# Patient Record
Sex: FEMALE | Race: BLACK OR AFRICAN AMERICAN | NOT HISPANIC OR LATINO | Employment: UNEMPLOYED | URBAN - METROPOLITAN AREA
[De-identification: names, ages, dates, MRNs, and addresses within clinical notes are randomized per-mention and may not be internally consistent; named-entity substitution may affect disease eponyms.]

---

## 2017-12-07 ENCOUNTER — HOSPITAL ENCOUNTER (EMERGENCY)
Facility: MEDICAL CENTER | Age: 57
End: 2017-12-07
Attending: EMERGENCY MEDICINE
Payer: MEDICARE

## 2017-12-07 VITALS
TEMPERATURE: 97.5 F | WEIGHT: 166.01 LBS | DIASTOLIC BLOOD PRESSURE: 70 MMHG | SYSTOLIC BLOOD PRESSURE: 142 MMHG | BODY MASS INDEX: 27.66 KG/M2 | RESPIRATION RATE: 18 BRPM | OXYGEN SATURATION: 95 % | HEART RATE: 75 BPM | HEIGHT: 65 IN

## 2017-12-07 DIAGNOSIS — R11.0 NAUSEA: ICD-10-CM

## 2017-12-07 DIAGNOSIS — J34.89 RHINORRHEA: ICD-10-CM

## 2017-12-07 PROCEDURE — 99283 EMERGENCY DEPT VISIT LOW MDM: CPT

## 2017-12-07 NOTE — DISCHARGE INSTRUCTIONS
Nausea, Adult  Nausea means you feel sick to your stomach or need to throw up (vomit). It may be a sign of a more serious problem. If nausea gets worse, you may throw up. If you throw up a lot, you may lose too much body fluid (dehydration).  HOME CARE   · Get plenty of rest.  · Ask your doctor how to replace body fluid losses (rehydrate).  · Eat small amounts of food. Sip liquids more often.  · Take all medicines as told by your doctor.  GET HELP RIGHT AWAY IF:  · You have a fever.  · You pass out (faint).  · You keep throwing up or have blood in your throw up.  · You are very weak, have dry lips or a dry mouth, or you are very thirsty (dehydrated).  · You have dark or bloody poop (stool).  · You have very bad chest or belly (abdominal) pain.  · You do not get better after 2 days, or you get worse.  · You have a headache.  MAKE SURE YOU:  · Understand these instructions.  · Will watch your condition.  · Will get help right away if you are not doing well or get worse.     This information is not intended to replace advice given to you by your health care provider. Make sure you discuss any questions you have with your health care provider.     Document Released: 12/06/2012 Document Revised: 03/11/2013 Document Reviewed: 12/06/2012  [employer] Interactive Patient Education ©2016 [employer]

## 2017-12-07 NOTE — ED NOTES
Wheeled to triage  Chief Complaint   Patient presents with   • Cold Symptoms     wants to make sure she's OK to fly home to [place] on Monday     Pt has a hx of an aneurysm and having a pace maker placed less than a year ago, pt denies any CP or SOB, family is concerned about her flying home because she has a cold.

## 2017-12-08 ENCOUNTER — PATIENT OUTREACH (OUTPATIENT)
Dept: HEALTH INFORMATION MANAGEMENT | Facility: OTHER | Age: 57
End: 2017-12-08

## 2019-12-09 ENCOUNTER — HOSPITAL ENCOUNTER (EMERGENCY)
Dept: HOSPITAL 87 - ER | Age: 59
LOS: 1 days | Discharge: HOME | End: 2019-12-10
Payer: MEDICARE

## 2019-12-09 VITALS — BODY MASS INDEX: 32.37 KG/M2 | WEIGHT: 189.6 LBS | HEIGHT: 64 IN

## 2019-12-09 DIAGNOSIS — Z79.82: ICD-10-CM

## 2019-12-09 DIAGNOSIS — Z86.73: ICD-10-CM

## 2019-12-09 DIAGNOSIS — R51: ICD-10-CM

## 2019-12-09 DIAGNOSIS — I11.0: ICD-10-CM

## 2019-12-09 DIAGNOSIS — R06.02: Primary | ICD-10-CM

## 2019-12-09 DIAGNOSIS — Z95.0: ICD-10-CM

## 2019-12-09 DIAGNOSIS — Z98.890: ICD-10-CM

## 2019-12-09 DIAGNOSIS — E78.00: ICD-10-CM

## 2019-12-09 DIAGNOSIS — I50.9: ICD-10-CM

## 2019-12-09 DIAGNOSIS — Z79.899: ICD-10-CM

## 2019-12-09 LAB
BASOPHILS NFR BLD AUTO: 0.5 % (ref 0–2)
CHLORIDE SERPL-SCNC: 108 MEQ/L (ref 98–107)
EOSINOPHIL NFR BLD AUTO: 0.7 % (ref 0–5)
ERYTHROCYTE [DISTWIDTH] IN BLOOD BY AUTOMATED COUNT: 16.9 % (ref 11.6–14.6)
HCT VFR BLD AUTO: 35.2 % (ref 36–48)
HGB BLD-MCNC: 11.2 G/DL (ref 12–16)
LYMPHOCYTES NFR BLD AUTO: 24.7 % (ref 20–50)
MCH RBC QN AUTO: 26.6 PG (ref 28–32)
MCV RBC AUTO: 83.3 FL (ref 81–99)
MONOCYTES NFR BLD AUTO: 8.3 % (ref 2–8)
NEUTROPHILS NFR BLD AUTO: 65.8 % (ref 40–76)
PLATELET # BLD AUTO: 312 X1000/UL (ref 130–400)
PMV BLD AUTO: 8 FL (ref 7.4–10.4)
RBC # BLD AUTO: 4.22 MILL/UL (ref 4.2–5.4)

## 2019-12-09 PROCEDURE — 93005 ELECTROCARDIOGRAM TRACING: CPT

## 2019-12-09 PROCEDURE — 99284 EMERGENCY DEPT VISIT MOD MDM: CPT

## 2019-12-09 PROCEDURE — 36415 COLL VENOUS BLD VENIPUNCTURE: CPT

## 2019-12-09 PROCEDURE — 83880 ASSAY OF NATRIURETIC PEPTIDE: CPT

## 2019-12-09 PROCEDURE — 71045 X-RAY EXAM CHEST 1 VIEW: CPT

## 2019-12-09 PROCEDURE — 84484 ASSAY OF TROPONIN QUANT: CPT

## 2019-12-10 VITALS — SYSTOLIC BLOOD PRESSURE: 132 MMHG | DIASTOLIC BLOOD PRESSURE: 79 MMHG

## 2021-03-10 ENCOUNTER — HOSPITAL ENCOUNTER (OUTPATIENT)
Dept: HOSPITAL 87 - LAB | Age: 61
Discharge: HOME | End: 2021-03-10
Attending: INTERNAL MEDICINE
Payer: MEDICARE

## 2021-03-10 DIAGNOSIS — Z20.822: Primary | ICD-10-CM

## 2021-03-10 PROCEDURE — 87426 SARSCOV CORONAVIRUS AG IA: CPT

## 2021-03-11 ENCOUNTER — HOSPITAL ENCOUNTER (OUTPATIENT)
Dept: HOSPITAL 87 - US | Age: 61
Discharge: HOME | End: 2021-03-11
Attending: INTERNAL MEDICINE
Payer: MEDICARE

## 2021-03-11 DIAGNOSIS — R10.9: ICD-10-CM

## 2021-03-11 DIAGNOSIS — R10.819: Primary | ICD-10-CM

## 2021-03-11 PROCEDURE — 76770 US EXAM ABDO BACK WALL COMP: CPT

## 2024-06-11 ENCOUNTER — HOSPITAL ENCOUNTER (INPATIENT)
Dept: HOSPITAL 87 - ER | Age: 64
LOS: 5 days | Discharge: HOME HEALTH | DRG: 291 | End: 2024-06-16
Attending: STUDENT IN AN ORGANIZED HEALTH CARE EDUCATION/TRAINING PROGRAM | Admitting: STUDENT IN AN ORGANIZED HEALTH CARE EDUCATION/TRAINING PROGRAM
Payer: MEDICARE

## 2024-06-11 VITALS — BODY MASS INDEX: 28.93 KG/M2 | HEIGHT: 66 IN | WEIGHT: 180 LBS

## 2024-06-11 DIAGNOSIS — Z79.01: ICD-10-CM

## 2024-06-11 DIAGNOSIS — Z20.822: ICD-10-CM

## 2024-06-11 DIAGNOSIS — I50.33: ICD-10-CM

## 2024-06-11 DIAGNOSIS — Z95.3: ICD-10-CM

## 2024-06-11 DIAGNOSIS — N17.9: ICD-10-CM

## 2024-06-11 DIAGNOSIS — I11.0: Primary | ICD-10-CM

## 2024-06-11 DIAGNOSIS — I07.1: ICD-10-CM

## 2024-06-11 DIAGNOSIS — D64.0: ICD-10-CM

## 2024-06-11 DIAGNOSIS — Z95.0: ICD-10-CM

## 2024-06-11 DIAGNOSIS — Z87.891: ICD-10-CM

## 2024-06-11 DIAGNOSIS — I47.20: ICD-10-CM

## 2024-06-11 DIAGNOSIS — I95.9: ICD-10-CM

## 2024-06-11 DIAGNOSIS — J18.9: ICD-10-CM

## 2024-06-11 DIAGNOSIS — F41.0: ICD-10-CM

## 2024-06-11 DIAGNOSIS — D50.9: ICD-10-CM

## 2024-06-11 DIAGNOSIS — J96.00: ICD-10-CM

## 2024-06-11 DIAGNOSIS — E78.5: ICD-10-CM

## 2024-06-11 DIAGNOSIS — I48.0: ICD-10-CM

## 2024-06-11 DIAGNOSIS — I69.354: ICD-10-CM

## 2024-06-11 DIAGNOSIS — F32.9: ICD-10-CM

## 2024-06-11 LAB
ALBUMIN SERPL BCP-MCNC: 3.7 G/DL (ref 3.2–4.8)
ALT SERPL W P-5'-P-CCNC: 23 IU/L (ref 10–49)
APTT PPP: 34.7 SEC (ref 23.4–31)
AST SERPL W P-5'-P-CCNC: 34 IU/L (ref ?–34)
BASOPHILS NFR BLD AUTO: 1 % (ref 0–2)
BILIRUB DIRECT SERPL-MCNC: 0.2 MG/DL (ref ?–3)
BILIRUB SERPL-MCNC: 0.5 MG/DL (ref 0.1–1)
BUN SERPL-MCNC: 24 MG/DL (ref 9–23)
CALCIUM SERPL-MCNC: 9.1 MG/DL (ref 8.7–10.4)
CARDIAC TROPONIN I PNL SERPL HS: 18 NG/L (ref 3–34)
CHLORIDE SERPL-SCNC: 109 MEQ/L (ref 98–107)
CO2 SERPL-SCNC: 27 MEQ/L (ref 21–32)
CREAT SERPL-MCNC: 1.6 MG/DL (ref 0.6–1)
EOSINOPHIL NFR BLD AUTO: 1.4 % (ref 0–5)
ERYTHROCYTE [DISTWIDTH] IN BLOOD BY AUTOMATED COUNT: 18.1 % (ref 11.6–14.6)
GLUCOSE SERPL-MCNC: 107 MG/DL (ref 70–105)
HCT VFR BLD AUTO: 27.4 % (ref 36–48)
HGB BLD-MCNC: 8.4 G/DL (ref 12–16)
INR PPP: 3.1
LYMPHOCYTES NFR BLD AUTO: 21.7 % (ref 20–50)
MANUAL DIF COMMENT BLD-IMP: 0
MCH RBC QN AUTO: 24.1 PG (ref 28–32)
MCHC RBC AUTO-ENTMCNC: 30.5 G/DL (ref 31–37)
MCV RBC AUTO: 79 FL (ref 81–99)
MONOCYTES NFR BLD AUTO: 9.2 % (ref 2–8)
NEUTROPHILS NFR BLD AUTO: 66.7 % (ref 40–76)
PLATELET # BLD AUTO: 260 X1000/UL (ref 130–400)
PMV BLD AUTO: 7 FL (ref 7.4–10.4)
POTASSIUM SERPL-SCNC: 4 MEQ/L (ref 3.5–5.1)
PROT SERPL-MCNC: 6.6 G/DL (ref 6–8.3)
PROTHROMBIN TIME: 31.8 SEC (ref 9.6–11)
RBC # BLD AUTO: 3.46 MILL/UL (ref 4.2–5.4)
SODIUM SERPL-SCNC: 143 MEQ/L (ref 136–145)
WBC # BLD: 5.2 X1000/UL (ref 4.5–11)

## 2024-06-11 PROCEDURE — 82728 ASSAY OF FERRITIN: CPT

## 2024-06-11 PROCEDURE — 87426 SARSCOV CORONAVIRUS AG IA: CPT

## 2024-06-11 PROCEDURE — 93306 TTE W/DOPPLER COMPLETE: CPT

## 2024-06-11 PROCEDURE — 71045 X-RAY EXAM CHEST 1 VIEW: CPT

## 2024-06-11 PROCEDURE — 97162 PT EVAL MOD COMPLEX 30 MIN: CPT

## 2024-06-11 PROCEDURE — 84484 ASSAY OF TROPONIN QUANT: CPT

## 2024-06-11 PROCEDURE — 83540 ASSAY OF IRON: CPT

## 2024-06-11 PROCEDURE — 80061 LIPID PANEL: CPT

## 2024-06-11 PROCEDURE — 81003 URINALYSIS AUTO W/O SCOPE: CPT

## 2024-06-11 PROCEDURE — 87491 CHLMYD TRACH DNA AMP PROBE: CPT

## 2024-06-11 PROCEDURE — 84100 ASSAY OF PHOSPHORUS: CPT

## 2024-06-11 PROCEDURE — 84439 ASSAY OF FREE THYROXINE: CPT

## 2024-06-11 PROCEDURE — 94640 AIRWAY INHALATION TREATMENT: CPT

## 2024-06-11 PROCEDURE — 84156 ASSAY OF PROTEIN URINE: CPT

## 2024-06-11 PROCEDURE — 82270 OCCULT BLOOD FECES: CPT

## 2024-06-11 PROCEDURE — 80048 BASIC METABOLIC PNL TOTAL CA: CPT

## 2024-06-11 PROCEDURE — 36415 COLL VENOUS BLD VENIPUNCTURE: CPT

## 2024-06-11 PROCEDURE — 93005 ELECTROCARDIOGRAM TRACING: CPT

## 2024-06-11 PROCEDURE — 83880 ASSAY OF NATRIURETIC PEPTIDE: CPT

## 2024-06-11 PROCEDURE — 97165 OT EVAL LOW COMPLEX 30 MIN: CPT

## 2024-06-11 PROCEDURE — 83550 IRON BINDING TEST: CPT

## 2024-06-11 PROCEDURE — 87804 INFLUENZA ASSAY W/OPTIC: CPT

## 2024-06-11 PROCEDURE — 87591 N.GONORRHOEAE DNA AMP PROB: CPT

## 2024-06-11 PROCEDURE — 84443 ASSAY THYROID STIM HORMONE: CPT

## 2024-06-11 PROCEDURE — 80305 DRUG TEST PRSMV DIR OPT OBS: CPT

## 2024-06-11 PROCEDURE — 85027 COMPLETE CBC AUTOMATED: CPT

## 2024-06-11 PROCEDURE — 83735 ASSAY OF MAGNESIUM: CPT

## 2024-06-11 PROCEDURE — 80076 HEPATIC FUNCTION PANEL: CPT

## 2024-06-11 PROCEDURE — 99285 EMERGENCY DEPT VISIT HI MDM: CPT

## 2024-06-11 PROCEDURE — 85025 COMPLETE CBC W/AUTO DIFF WBC: CPT

## 2024-06-11 PROCEDURE — 82570 ASSAY OF URINE CREATININE: CPT

## 2024-06-11 RX ADMIN — Medication NR MG: at 23:32

## 2024-06-11 RX ADMIN — AZITHROMYCIN FOR INJECTION INJECTION, POWDER, LYOPHILIZED, FOR SOLUTION NR MLS/HR: 500 INJECTION INTRAVENOUS at 23:43

## 2024-06-11 RX ADMIN — CEFTRIAXONE ONE MLS/HR: 1 INJECTION, SOLUTION INTRAVENOUS at 22:54

## 2024-06-11 RX ADMIN — ACETAMINOPHEN STA MG: 325 TABLET, FILM COATED ORAL at 22:55

## 2024-06-11 RX ADMIN — FUROSEMIDE ONE MG: 10 INJECTION, SOLUTION INTRAMUSCULAR; INTRAVENOUS at 22:55

## 2024-06-12 VITALS
DIASTOLIC BLOOD PRESSURE: 65 MMHG | TEMPERATURE: 97 F | SYSTOLIC BLOOD PRESSURE: 114 MMHG | RESPIRATION RATE: 18 BRPM | HEART RATE: 82 BPM

## 2024-06-12 VITALS
HEART RATE: 71 BPM | SYSTOLIC BLOOD PRESSURE: 113 MMHG | RESPIRATION RATE: 21 BRPM | DIASTOLIC BLOOD PRESSURE: 65 MMHG | TEMPERATURE: 97.7 F

## 2024-06-12 VITALS
SYSTOLIC BLOOD PRESSURE: 125 MMHG | DIASTOLIC BLOOD PRESSURE: 63 MMHG | TEMPERATURE: 96.2 F | RESPIRATION RATE: 20 BRPM | HEART RATE: 68 BPM

## 2024-06-12 VITALS
HEART RATE: 71 BPM | DIASTOLIC BLOOD PRESSURE: 48 MMHG | RESPIRATION RATE: 17 BRPM | SYSTOLIC BLOOD PRESSURE: 86 MMHG | TEMPERATURE: 98.8 F

## 2024-06-12 VITALS
DIASTOLIC BLOOD PRESSURE: 56 MMHG | TEMPERATURE: 95.4 F | HEART RATE: 69 BPM | SYSTOLIC BLOOD PRESSURE: 98 MMHG | RESPIRATION RATE: 19 BRPM

## 2024-06-12 VITALS
DIASTOLIC BLOOD PRESSURE: 69 MMHG | SYSTOLIC BLOOD PRESSURE: 123 MMHG | TEMPERATURE: 97.1 F | RESPIRATION RATE: 18 BRPM | HEART RATE: 71 BPM

## 2024-06-12 LAB
AMPHETAMINES UR QL SCN: NEGATIVE
APPEARANCE UR: CLEAR
BACTERIA #/AREA URNS AUTO: (no result) /[HPF]
BARBITURATES UR QL SCN: NEGATIVE
BASOPHILS NFR BLD AUTO: 1 % (ref 0–2)
BENZODIAZ UR QL SCN: (no result)
BUN SERPL-MCNC: 28 MG/DL (ref 9–23)
BZE UR QL SCN: NEGATIVE
CALCIUM SERPL-MCNC: 8.6 MG/DL (ref 8.7–10.4)
CANNABINOIDS UR QL SCN: NEGATIVE
CARDIAC TROPONIN I PNL SERPL HS: 12 NG/L (ref 3–34)
CHLORIDE SERPL-SCNC: 109 MEQ/L (ref 98–107)
CHOLEST SERPL-MCNC: 133 MG/DL (ref ?–200)
CO2 SERPL-SCNC: 24 MEQ/L (ref 21–32)
COLOR UR: (no result)
CREAT SERPL-MCNC: 1.8 MG/DL (ref 0.6–1)
CREAT UR-MCNC: 186.5 MG/DL
EOSINOPHIL NFR BLD AUTO: 0.4 % (ref 0–5)
ERYTHROCYTE [DISTWIDTH] IN BLOOD BY AUTOMATED COUNT: 18 % (ref 11.6–14.6)
GLUCOSE SERPL-MCNC: 94 MG/DL (ref 70–105)
GLUCOSE UR STRIP.AUTO-MCNC: NEGATIVE MG/DL
HCT VFR BLD AUTO: 25.7 % (ref 36–48)
HDLC SERPL-MCNC: 66 MG/DL (ref 65–?)
HGB BLD-MCNC: 8 G/DL (ref 12–16)
HGB UR QL STRIP: (no result)
HYALINE CASTS #/AREA URNS AUTO: (no result) /LPF
INR PPP: 3
IRON SERPL-MCNC: 16 UG/DL (ref 50–170)
KETONES UR STRIP-MCNC: (no result) MG/DL
LDLC SERPL DIRECT ASSAY-MCNC: 51 MG/DL (ref 5–100)
LEUKOCYTE ESTERASE UR QL STRIP: NEGATIVE
LYMPHOCYTES NFR BLD AUTO: 17.9 % (ref 20–50)
MANUAL DIF COMMENT BLD-IMP: 0
MCH RBC QN AUTO: 24.4 PG (ref 28–32)
MCHC RBC AUTO-ENTMCNC: 31.2 G/DL (ref 31–37)
MCV RBC AUTO: 78.2 FL (ref 81–99)
MDMA UR QL SCN: (no result)
METHADONE UR QL SCN: NEGATIVE
MONOCYTES NFR BLD AUTO: 11 % (ref 2–8)
NEUTROPHILS NFR BLD AUTO: 69.7 % (ref 40–76)
NITRITE UR QL STRIP: NEGATIVE
OPIATES UR QL SCN: NEGATIVE
PCP UR QL SCN: NEGATIVE
PH UR STRIP: 5 [PH] (ref 4.5–8)
PHOSPHATE SERPL-MCNC: 4.1 MG/DL (ref 2.5–4.9)
PLATELET # BLD AUTO: 236 X1000/UL (ref 130–400)
PMV BLD AUTO: 7.5 FL (ref 7.4–10.4)
POTASSIUM SERPL-SCNC: 3.9 MEQ/L (ref 3.5–5.1)
PROT UR QL STRIP: (no result)
PROT UR-ACNC: 120 MG/DL
PROTHROMBIN TIME: 31 SEC (ref 9.6–11)
RBC # BLD AUTO: 3.29 MILL/UL (ref 4.2–5.4)
RBC #/AREA URNS AUTO: (no result) /HPF (ref 0–2)
SODIUM SERPL-SCNC: 142 MEQ/L (ref 136–145)
SP GR UR STRIP: 1.02 (ref 1–1.03)
SQUAMOUS #/AREA URNS AUTO: (no result) /LPF
T4 FREE SERPL-MCNC: 1.41 NG/DL (ref 0.89–1.76)
TIBC SERPL-MCNC: 318 UG/DL (ref 250–425)
TRIGL SERPL-MCNC: 56 MG/DL (ref 0–150)
TSH SERPL DL<=0.005 MIU/L-ACNC: 1.71 UIU/ML (ref 0.55–4.78)
UROBILINOGEN UR STRIP-MCNC: 1 E.U./DL (ref 0.2–1)
WBC # BLD: 5.2 X1000/UL (ref 4.5–11)
WBC #/AREA URNS AUTO: (no result) /HPF (ref 0–2)

## 2024-06-12 RX ADMIN — FOLIC ACID SCH MG: 1 TABLET ORAL at 08:19

## 2024-06-12 RX ADMIN — DOXYCYCLINE SCH MLS/HR: 100 INJECTION, POWDER, LYOPHILIZED, FOR SOLUTION INTRAVENOUS at 09:02

## 2024-06-12 RX ADMIN — METOPROLOL TARTRATE SCH MG: 25 TABLET ORAL at 20:52

## 2024-06-12 RX ADMIN — ATORVASTATIN CALCIUM SCH MG: 10 TABLET, FILM COATED ORAL at 20:51

## 2024-06-12 RX ADMIN — SODIUM CHLORIDE ONE MLS/HR: 450 INJECTION, SOLUTION INTRAVENOUS at 03:11

## 2024-06-12 RX ADMIN — ALPRAZOLAM NR MG: 0.25 TABLET ORAL at 00:56

## 2024-06-12 RX ADMIN — AMIODARONE HYDROCHLORIDE SCH MG: 200 TABLET ORAL at 08:19

## 2024-06-13 VITALS
RESPIRATION RATE: 18 BRPM | TEMPERATURE: 97.4 F | HEART RATE: 72 BPM | SYSTOLIC BLOOD PRESSURE: 136 MMHG | DIASTOLIC BLOOD PRESSURE: 68 MMHG

## 2024-06-13 VITALS
DIASTOLIC BLOOD PRESSURE: 72 MMHG | TEMPERATURE: 98.1 F | HEART RATE: 72 BPM | RESPIRATION RATE: 22 BRPM | SYSTOLIC BLOOD PRESSURE: 125 MMHG

## 2024-06-13 VITALS
TEMPERATURE: 96.7 F | RESPIRATION RATE: 20 BRPM | SYSTOLIC BLOOD PRESSURE: 114 MMHG | DIASTOLIC BLOOD PRESSURE: 50 MMHG | HEART RATE: 68 BPM

## 2024-06-13 VITALS
RESPIRATION RATE: 20 BRPM | DIASTOLIC BLOOD PRESSURE: 58 MMHG | HEART RATE: 74 BPM | SYSTOLIC BLOOD PRESSURE: 112 MMHG | TEMPERATURE: 98.2 F

## 2024-06-13 VITALS
DIASTOLIC BLOOD PRESSURE: 62 MMHG | SYSTOLIC BLOOD PRESSURE: 110 MMHG | RESPIRATION RATE: 20 BRPM | HEART RATE: 68 BPM | TEMPERATURE: 97.8 F

## 2024-06-13 VITALS
TEMPERATURE: 97.6 F | DIASTOLIC BLOOD PRESSURE: 78 MMHG | RESPIRATION RATE: 20 BRPM | HEART RATE: 72 BPM | SYSTOLIC BLOOD PRESSURE: 110 MMHG

## 2024-06-13 LAB
BUN SERPL-MCNC: 33 MG/DL (ref 9–23)
CALCIUM SERPL-MCNC: 8.6 MG/DL (ref 8.7–10.4)
CHLORIDE SERPL-SCNC: 109 MEQ/L (ref 98–107)
CO2 SERPL-SCNC: 24 MEQ/L (ref 21–32)
CREAT SERPL-MCNC: 1.5 MG/DL (ref 0.6–1)
ERYTHROCYTE [DISTWIDTH] IN BLOOD BY AUTOMATED COUNT: 18.1 % (ref 11.6–14.6)
GLUCOSE SERPL-MCNC: 102 MG/DL (ref 70–105)
HCT VFR BLD AUTO: 24.6 % (ref 36–48)
HGB BLD-MCNC: 7.5 G/DL (ref 12–16)
INR PPP: 2.8
MCH RBC QN AUTO: 24 PG (ref 28–32)
MCHC RBC AUTO-ENTMCNC: 30.3 G/DL (ref 31–37)
MCV RBC AUTO: 79.2 FL (ref 81–99)
PLATELET # BLD AUTO: 226 X1000/UL (ref 130–400)
POTASSIUM SERPL-SCNC: 4 MEQ/L (ref 3.5–5.1)
PROTHROMBIN TIME: 29 SEC (ref 9.6–11)
RBC # BLD AUTO: 3.11 MILL/UL (ref 4.2–5.4)
SODIUM SERPL-SCNC: 141 MEQ/L (ref 136–145)
WBC # BLD: 4.6 X1000/UL (ref 4.5–11)

## 2024-06-13 RX ADMIN — MONTELUKAST SODIUM SCH MG: 10 TABLET, FILM COATED ORAL at 17:42

## 2024-06-13 RX ADMIN — Medication PRN MG: at 16:20

## 2024-06-13 RX ADMIN — WARFARIN SODIUM SCH MG: 2 TABLET ORAL at 17:42

## 2024-06-13 RX ADMIN — IRON SUPPLEMENT SCH MG: 325 TABLET ORAL at 14:48

## 2024-06-13 RX ADMIN — DOCUSATE SODIUM PRN MG: 100 CAPSULE, LIQUID FILLED ORAL at 09:31

## 2024-06-13 RX ADMIN — BUMETANIDE SCH MG: 0.25 INJECTION INTRAMUSCULAR; INTRAVENOUS at 09:10

## 2024-06-13 RX ADMIN — CITALOPRAM HYDROBROMIDE SCH MG: 10 TABLET ORAL at 14:48

## 2024-06-13 RX ADMIN — HYDROCODONE BITARTRATE AND ACETAMINOPHEN NR TAB: 5; 325 TABLET ORAL at 04:33

## 2024-06-14 VITALS
TEMPERATURE: 96.6 F | SYSTOLIC BLOOD PRESSURE: 133 MMHG | RESPIRATION RATE: 18 BRPM | DIASTOLIC BLOOD PRESSURE: 66 MMHG | HEART RATE: 70 BPM

## 2024-06-14 VITALS
TEMPERATURE: 96.5 F | RESPIRATION RATE: 22 BRPM | DIASTOLIC BLOOD PRESSURE: 59 MMHG | HEART RATE: 69 BPM | SYSTOLIC BLOOD PRESSURE: 116 MMHG

## 2024-06-14 VITALS — OXYGEN SATURATION: 98 % | HEART RATE: 72 BPM | RESPIRATION RATE: 18 BRPM

## 2024-06-14 VITALS
RESPIRATION RATE: 20 BRPM | HEART RATE: 70 BPM | TEMPERATURE: 96.1 F | DIASTOLIC BLOOD PRESSURE: 57 MMHG | SYSTOLIC BLOOD PRESSURE: 126 MMHG

## 2024-06-14 VITALS
DIASTOLIC BLOOD PRESSURE: 61 MMHG | SYSTOLIC BLOOD PRESSURE: 124 MMHG | HEART RATE: 78 BPM | RESPIRATION RATE: 17 BRPM | TEMPERATURE: 97.2 F

## 2024-06-14 VITALS
DIASTOLIC BLOOD PRESSURE: 69 MMHG | TEMPERATURE: 97.9 F | HEART RATE: 73 BPM | RESPIRATION RATE: 18 BRPM | SYSTOLIC BLOOD PRESSURE: 139 MMHG

## 2024-06-14 VITALS
TEMPERATURE: 97.3 F | HEART RATE: 75 BPM | SYSTOLIC BLOOD PRESSURE: 124 MMHG | RESPIRATION RATE: 22 BRPM | DIASTOLIC BLOOD PRESSURE: 67 MMHG

## 2024-06-14 VITALS — RESPIRATION RATE: 20 BRPM | HEART RATE: 70 BPM | OXYGEN SATURATION: 97 %

## 2024-06-14 LAB
INR PPP: 2.6
PROTHROMBIN TIME: 26.7 SEC (ref 9.6–11)

## 2024-06-14 RX ADMIN — ALPRAZOLAM PRN MG: 0.25 TABLET ORAL at 00:31

## 2024-06-14 RX ADMIN — ACETAMINOPHEN PRN MG: 325 TABLET, FILM COATED ORAL at 13:33

## 2024-06-14 RX ADMIN — IPRATROPIUM BROMIDE AND ALBUTEROL SULFATE PRN ML: .5; 3 SOLUTION RESPIRATORY (INHALATION) at 13:09

## 2024-06-14 RX ADMIN — IPRATROPIUM BROMIDE AND ALBUTEROL SULFATE SCH ML: .5; 3 SOLUTION RESPIRATORY (INHALATION) at 08:20

## 2024-06-14 RX ADMIN — IRON SUPPLEMENT SCH MG: 325 TABLET ORAL at 08:53

## 2024-06-15 VITALS — OXYGEN SATURATION: 98 % | HEART RATE: 74 BPM | RESPIRATION RATE: 18 BRPM

## 2024-06-15 VITALS
RESPIRATION RATE: 15 BRPM | HEART RATE: 76 BPM | TEMPERATURE: 97.2 F | SYSTOLIC BLOOD PRESSURE: 131 MMHG | DIASTOLIC BLOOD PRESSURE: 64 MMHG

## 2024-06-15 VITALS
HEART RATE: 70 BPM | SYSTOLIC BLOOD PRESSURE: 124 MMHG | RESPIRATION RATE: 18 BRPM | DIASTOLIC BLOOD PRESSURE: 62 MMHG | TEMPERATURE: 97.1 F

## 2024-06-15 VITALS — RESPIRATION RATE: 18 BRPM | HEART RATE: 79 BPM | OXYGEN SATURATION: 97 %

## 2024-06-15 VITALS
TEMPERATURE: 97.8 F | HEART RATE: 70 BPM | RESPIRATION RATE: 22 BRPM | DIASTOLIC BLOOD PRESSURE: 52 MMHG | SYSTOLIC BLOOD PRESSURE: 147 MMHG

## 2024-06-15 VITALS — RESPIRATION RATE: 16 BRPM | OXYGEN SATURATION: 98 % | HEART RATE: 76 BPM

## 2024-06-15 VITALS
SYSTOLIC BLOOD PRESSURE: 142 MMHG | RESPIRATION RATE: 22 BRPM | TEMPERATURE: 96.9 F | DIASTOLIC BLOOD PRESSURE: 63 MMHG | HEART RATE: 73 BPM

## 2024-06-15 VITALS
RESPIRATION RATE: 17 BRPM | DIASTOLIC BLOOD PRESSURE: 63 MMHG | HEART RATE: 71 BPM | TEMPERATURE: 97.1 F | SYSTOLIC BLOOD PRESSURE: 116 MMHG

## 2024-06-15 VITALS — RESPIRATION RATE: 16 BRPM | HEART RATE: 70 BPM | OXYGEN SATURATION: 97 %

## 2024-06-15 VITALS
RESPIRATION RATE: 20 BRPM | HEART RATE: 76 BPM | SYSTOLIC BLOOD PRESSURE: 129 MMHG | DIASTOLIC BLOOD PRESSURE: 53 MMHG | TEMPERATURE: 97.1 F

## 2024-06-15 LAB
BASOPHILS NFR BLD AUTO: 0.9 % (ref 0–2)
BUN SERPL-MCNC: 25 MG/DL (ref 9–23)
CALCIUM SERPL-MCNC: 8.7 MG/DL (ref 8.7–10.4)
CHLORIDE SERPL-SCNC: 109 MEQ/L (ref 98–107)
CO2 SERPL-SCNC: 23 MEQ/L (ref 21–32)
CREAT SERPL-MCNC: 1.1 MG/DL (ref 0.6–1)
EOSINOPHIL NFR BLD AUTO: 1.2 % (ref 0–5)
ERYTHROCYTE [DISTWIDTH] IN BLOOD BY AUTOMATED COUNT: 18.1 % (ref 11.6–14.6)
GLUCOSE SERPL-MCNC: 85 MG/DL (ref 70–105)
HCT VFR BLD AUTO: 25.3 % (ref 36–48)
HGB BLD-MCNC: 7.5 G/DL (ref 12–16)
INR PPP: 2.5
LYMPHOCYTES NFR BLD AUTO: 23.7 % (ref 20–50)
MANUAL DIF COMMENT BLD-IMP: 1
MCH RBC QN AUTO: 23.7 PG (ref 28–32)
MCHC RBC AUTO-ENTMCNC: 29.6 G/DL (ref 31–37)
MCV RBC AUTO: 80 FL (ref 81–99)
MONOCYTES NFR BLD AUTO: 13.5 % (ref 2–8)
NEUTROPHILS NFR BLD AUTO: 60.7 % (ref 40–76)
PLATELET # BLD AUTO: 211 X1000/UL (ref 130–400)
PMV BLD AUTO: 7.7 FL (ref 7.4–10.4)
POTASSIUM SERPL-SCNC: 4 MEQ/L (ref 3.5–5.1)
PROTHROMBIN TIME: 26.5 SEC (ref 9.6–11)
RBC # BLD AUTO: 3.16 MILL/UL (ref 4.2–5.4)
SODIUM SERPL-SCNC: 141 MEQ/L (ref 136–145)
WBC # BLD: 3.8 X1000/UL (ref 4.5–11)

## 2024-06-16 VITALS
DIASTOLIC BLOOD PRESSURE: 56 MMHG | RESPIRATION RATE: 15 BRPM | SYSTOLIC BLOOD PRESSURE: 134 MMHG | HEART RATE: 84 BPM | TEMPERATURE: 97.9 F

## 2024-06-16 VITALS
TEMPERATURE: 97 F | HEART RATE: 85 BPM | RESPIRATION RATE: 18 BRPM | DIASTOLIC BLOOD PRESSURE: 62 MMHG | SYSTOLIC BLOOD PRESSURE: 126 MMHG

## 2024-06-16 VITALS
RESPIRATION RATE: 18 BRPM | TEMPERATURE: 97.8 F | SYSTOLIC BLOOD PRESSURE: 151 MMHG | HEART RATE: 70 BPM | DIASTOLIC BLOOD PRESSURE: 84 MMHG

## 2024-06-16 VITALS
OXYGEN SATURATION: 98 % | TEMPERATURE: 97.8 F | DIASTOLIC BLOOD PRESSURE: 54 MMHG | HEART RATE: 70 BPM | SYSTOLIC BLOOD PRESSURE: 120 MMHG

## 2024-06-16 VITALS — RESPIRATION RATE: 16 BRPM | HEART RATE: 70 BPM

## 2024-06-16 VITALS
RESPIRATION RATE: 18 BRPM | SYSTOLIC BLOOD PRESSURE: 157 MMHG | HEART RATE: 70 BPM | DIASTOLIC BLOOD PRESSURE: 67 MMHG | TEMPERATURE: 97.9 F

## 2024-06-16 LAB
INR PPP: 2.7
PROTHROMBIN TIME: 28.3 SEC (ref 9.6–11)

## 2024-07-12 ENCOUNTER — HOSPITAL ENCOUNTER (OUTPATIENT)
Dept: HOSPITAL 87 - CT | Age: 64
Discharge: HOME | End: 2024-07-12
Attending: INTERNAL MEDICINE
Payer: MEDICARE

## 2024-07-12 DIAGNOSIS — I51.7: ICD-10-CM

## 2024-07-12 DIAGNOSIS — J90: Primary | ICD-10-CM

## 2024-07-12 DIAGNOSIS — R06.09: ICD-10-CM

## 2024-07-12 PROCEDURE — 71250 CT THORAX DX C-: CPT

## 2024-07-16 ENCOUNTER — HOSPITAL ENCOUNTER (INPATIENT)
Dept: HOSPITAL 87 - ER | Age: 64
LOS: 3 days | Discharge: HOME | DRG: 286 | End: 2024-07-19
Attending: INTERNAL MEDICINE | Admitting: INTERNAL MEDICINE
Payer: MEDICARE

## 2024-07-16 VITALS — BODY MASS INDEX: 29.02 KG/M2 | HEIGHT: 64 IN | WEIGHT: 170 LBS

## 2024-07-16 DIAGNOSIS — I50.43: ICD-10-CM

## 2024-07-16 DIAGNOSIS — E83.42: ICD-10-CM

## 2024-07-16 DIAGNOSIS — E87.6: ICD-10-CM

## 2024-07-16 DIAGNOSIS — N17.9: ICD-10-CM

## 2024-07-16 DIAGNOSIS — I25.10: ICD-10-CM

## 2024-07-16 DIAGNOSIS — Z87.891: ICD-10-CM

## 2024-07-16 DIAGNOSIS — Z95.2: ICD-10-CM

## 2024-07-16 DIAGNOSIS — D50.0: ICD-10-CM

## 2024-07-16 DIAGNOSIS — I11.0: Primary | ICD-10-CM

## 2024-07-16 DIAGNOSIS — I48.20: ICD-10-CM

## 2024-07-16 DIAGNOSIS — F41.1: ICD-10-CM

## 2024-07-16 DIAGNOSIS — I07.1: ICD-10-CM

## 2024-07-16 DIAGNOSIS — I27.20: ICD-10-CM

## 2024-07-16 DIAGNOSIS — Z79.01: ICD-10-CM

## 2024-07-16 DIAGNOSIS — F32.A: ICD-10-CM

## 2024-07-16 DIAGNOSIS — Z86.73: ICD-10-CM

## 2024-07-16 DIAGNOSIS — Z95.0: ICD-10-CM

## 2024-07-16 DIAGNOSIS — Z91.199: ICD-10-CM

## 2024-07-16 LAB
ANISOCYTOSIS BLD QL: (no result)
BUN SERPL-MCNC: 17 MG/DL (ref 9–23)
CALCIUM SERPL-MCNC: 8.9 MG/DL (ref 8.7–10.4)
CARDIAC TROPONIN I PNL SERPL HS: 16 NG/L (ref 3–34)
CHLORIDE SERPL-SCNC: 109 MEQ/L (ref 98–107)
CO2 SERPL-SCNC: 26 MEQ/L (ref 21–32)
CREAT SERPL-MCNC: 1.2 MG/DL (ref 0.6–1)
EOSINOPHIL NFR BLD MANUAL: 3 % (ref 0–5)
ERYTHROCYTE [DISTWIDTH] IN BLOOD BY AUTOMATED COUNT: 20 % (ref 11.6–14.6)
GLUCOSE SERPL-MCNC: 95 MG/DL (ref 70–105)
HCT VFR BLD AUTO: 24.2 % (ref 36–48)
HGB BLD-MCNC: 6.8 G/DL (ref 12–16)
HYPOCHROMIA BLD QL: (no result)
LYMPHOCYTES NFR BLD MANUAL: 22 % (ref 20–60)
MANUAL DIF COMMENT BLD-IMP: 1
MCH RBC QN AUTO: 20.8 PG (ref 28–32)
MCHC RBC AUTO-ENTMCNC: 28.3 G/DL (ref 31–37)
MCV RBC AUTO: 73.7 FL (ref 81–99)
MICROCYTES BLD QL SMEAR: (no result)
MONOCYTES NFR BLD MANUAL: 11 % (ref 2–8)
NEUTS SEG NFR BLD MANUAL: 64 % (ref 45–75)
PLATELET # BLD AUTO: 203 X1000/UL (ref 130–400)
PLATELET # BLD EST: NORMAL 10*3/UL
PMV BLD AUTO: 7.2 FL (ref 7.4–10.4)
POTASSIUM SERPL-SCNC: 3.6 MEQ/L (ref 3.5–5.1)
RBC # BLD AUTO: 3.28 MILL/UL (ref 4.2–5.4)
SODIUM SERPL-SCNC: 142 MEQ/L (ref 136–145)
WBC # BLD: 3.5 X1000/UL (ref 4.5–11)

## 2024-07-16 PROCEDURE — 93005 ELECTROCARDIOGRAM TRACING: CPT

## 2024-07-16 PROCEDURE — 80053 COMPREHEN METABOLIC PANEL: CPT

## 2024-07-16 PROCEDURE — 86920 COMPATIBILITY TEST SPIN: CPT

## 2024-07-16 PROCEDURE — 80048 BASIC METABOLIC PNL TOTAL CA: CPT

## 2024-07-16 PROCEDURE — 86850 RBC ANTIBODY SCREEN: CPT

## 2024-07-16 PROCEDURE — 93458 L HRT ARTERY/VENTRICLE ANGIO: CPT

## 2024-07-16 PROCEDURE — 83880 ASSAY OF NATRIURETIC PEPTIDE: CPT

## 2024-07-16 PROCEDURE — 85027 COMPLETE CBC AUTOMATED: CPT

## 2024-07-16 PROCEDURE — 83735 ASSAY OF MAGNESIUM: CPT

## 2024-07-16 PROCEDURE — 84484 ASSAY OF TROPONIN QUANT: CPT

## 2024-07-16 PROCEDURE — 85025 COMPLETE CBC W/AUTO DIFF WBC: CPT

## 2024-07-16 PROCEDURE — 93306 TTE W/DOPPLER COMPLETE: CPT

## 2024-07-16 PROCEDURE — 36415 COLL VENOUS BLD VENIPUNCTURE: CPT

## 2024-07-16 PROCEDURE — 87426 SARSCOV CORONAVIRUS AG IA: CPT

## 2024-07-16 PROCEDURE — 80051 ELECTROLYTE PANEL: CPT

## 2024-07-16 PROCEDURE — 71045 X-RAY EXAM CHEST 1 VIEW: CPT

## 2024-07-16 PROCEDURE — 85044 MANUAL RETICULOCYTE COUNT: CPT

## 2024-07-16 PROCEDURE — 99291 CRITICAL CARE FIRST HOUR: CPT

## 2024-07-16 PROCEDURE — 82607 VITAMIN B-12: CPT

## 2024-07-16 PROCEDURE — 86900 BLOOD TYPING SEROLOGIC ABO: CPT

## 2024-07-16 RX ADMIN — DOCUSATE SODIUM SCH MG: 100 CAPSULE, LIQUID FILLED ORAL at 22:08

## 2024-07-16 RX ADMIN — Medication SCH ML: at 22:11

## 2024-07-16 RX ADMIN — OMEPRAZOLE SCH MG: 20 CAPSULE, DELAYED RELEASE ORAL at 22:08

## 2024-07-16 RX ADMIN — ATORVASTATIN CALCIUM SCH MG: 10 TABLET, FILM COATED ORAL at 22:08

## 2024-07-17 VITALS
HEART RATE: 70 BPM | TEMPERATURE: 98.2 F | SYSTOLIC BLOOD PRESSURE: 148 MMHG | RESPIRATION RATE: 18 BRPM | DIASTOLIC BLOOD PRESSURE: 69 MMHG

## 2024-07-17 VITALS
HEART RATE: 70 BPM | RESPIRATION RATE: 18 BRPM | DIASTOLIC BLOOD PRESSURE: 69 MMHG | TEMPERATURE: 98.2 F | SYSTOLIC BLOOD PRESSURE: 148 MMHG

## 2024-07-17 VITALS
DIASTOLIC BLOOD PRESSURE: 58 MMHG | HEART RATE: 71 BPM | SYSTOLIC BLOOD PRESSURE: 119 MMHG | RESPIRATION RATE: 20 BRPM | TEMPERATURE: 97.7 F

## 2024-07-17 LAB
ERYTHROCYTE [DISTWIDTH] IN BLOOD BY AUTOMATED COUNT: 20.4 % (ref 11.6–14.6)
HCT VFR BLD AUTO: 27 % (ref 36–48)
HGB BLD-MCNC: 8 G/DL (ref 12–16)
INR PPP: 1
MCH RBC QN AUTO: 22.5 PG (ref 28–32)
MCHC RBC AUTO-ENTMCNC: 29.7 G/DL (ref 31–37)
MCV RBC AUTO: 75.5 FL (ref 81–99)
PLATELET # BLD AUTO: 176 X1000/UL (ref 130–400)
PROTHROMBIN TIME: 11.4 SEC (ref 9.6–11)
RBC # BLD AUTO: 3.58 MILL/UL (ref 4.2–5.4)
VIT B12 SERPL-MCNC: > 2000 PG/ML (ref 211–911)
WBC # BLD: 4 X1000/UL (ref 4.5–11)

## 2024-07-17 RX ADMIN — EPOETIN ALFA-EPBX NR UNIT: 10000 INJECTION, SOLUTION INTRAVENOUS; SUBCUTANEOUS at 17:46

## 2024-07-17 RX ADMIN — HYDROCODONE BITARTRATE AND ACETAMINOPHEN PRN TAB: 10; 325 TABLET ORAL at 22:39

## 2024-07-17 RX ADMIN — DILTIAZEM HYDROCHLORIDE SCH MG: 30 TABLET, FILM COATED ORAL at 17:32

## 2024-07-17 RX ADMIN — FUROSEMIDE SCH MG: 10 INJECTION, SOLUTION INTRAMUSCULAR; INTRAVENOUS at 09:45

## 2024-07-17 RX ADMIN — FOLIC ACID SCH MG: 1 TABLET ORAL at 09:34

## 2024-07-17 RX ADMIN — HYDROCODONE BITARTRATE AND ACETAMINOPHEN PRN TAB: 10; 325 TABLET ORAL at 10:24

## 2024-07-17 RX ADMIN — LOSARTAN POTASSIUM SCH MG: 25 TABLET ORAL at 09:43

## 2024-07-17 RX ADMIN — AMIODARONE HYDROCHLORIDE SCH MG: 200 TABLET ORAL at 09:43

## 2024-07-17 RX ADMIN — IRON SUPPLEMENT SCH MG: 325 TABLET ORAL at 09:43

## 2024-07-17 RX ADMIN — ENOXAPARIN SODIUM SCH MG: 80 INJECTION SUBCUTANEOUS at 14:00

## 2024-07-18 VITALS
SYSTOLIC BLOOD PRESSURE: 127 MMHG | TEMPERATURE: 98.5 F | HEART RATE: 72 BPM | RESPIRATION RATE: 19 BRPM | DIASTOLIC BLOOD PRESSURE: 56 MMHG

## 2024-07-18 VITALS
RESPIRATION RATE: 18 BRPM | HEART RATE: 72 BPM | TEMPERATURE: 98 F | DIASTOLIC BLOOD PRESSURE: 50 MMHG | SYSTOLIC BLOOD PRESSURE: 124 MMHG

## 2024-07-18 VITALS
HEART RATE: 70 BPM | DIASTOLIC BLOOD PRESSURE: 74 MMHG | RESPIRATION RATE: 20 BRPM | TEMPERATURE: 97.9 F | SYSTOLIC BLOOD PRESSURE: 140 MMHG

## 2024-07-18 VITALS
SYSTOLIC BLOOD PRESSURE: 147 MMHG | DIASTOLIC BLOOD PRESSURE: 59 MMHG | TEMPERATURE: 97.5 F | RESPIRATION RATE: 20 BRPM | HEART RATE: 70 BPM

## 2024-07-18 VITALS
RESPIRATION RATE: 18 BRPM | HEART RATE: 74 BPM | DIASTOLIC BLOOD PRESSURE: 75 MMHG | SYSTOLIC BLOOD PRESSURE: 101 MMHG | TEMPERATURE: 98.1 F

## 2024-07-18 VITALS
SYSTOLIC BLOOD PRESSURE: 122 MMHG | TEMPERATURE: 97.7 F | HEART RATE: 74 BPM | RESPIRATION RATE: 20 BRPM | DIASTOLIC BLOOD PRESSURE: 59 MMHG

## 2024-07-18 LAB
ALBUMIN SERPL BCP-MCNC: 3.8 G/DL (ref 3.2–4.8)
ALT SERPL W P-5'-P-CCNC: 10 IU/L (ref 10–49)
ANISOCYTOSIS BLD QL: (no result)
AST SERPL W P-5'-P-CCNC: 27 IU/L (ref ?–34)
BILIRUB SERPL-MCNC: 0.9 MG/DL (ref 0.1–1)
BUN SERPL-MCNC: 9 MG/DL (ref 9–23)
CALCIUM SERPL-MCNC: 8.9 MG/DL (ref 8.7–10.4)
CHLORIDE SERPL-SCNC: 108 MEQ/L (ref 98–107)
CO2 SERPL-SCNC: 23 MEQ/L (ref 21–32)
CREAT SERPL-MCNC: 1 MG/DL (ref 0.6–1)
ERYTHROCYTE [DISTWIDTH] IN BLOOD BY AUTOMATED COUNT: 20 % (ref 11.6–14.6)
GLUCOSE SERPL-MCNC: 126 MG/DL (ref 70–105)
HCT VFR BLD AUTO: 32.3 % (ref 36–48)
HGB BLD-MCNC: 9.6 G/DL (ref 12–16)
HYPOCHROMIA BLD QL: (no result)
INR PPP: 1
LYMPHOCYTES NFR BLD MANUAL: 22 % (ref 20–60)
MANUAL DIF COMMENT BLD-IMP: 1
MCH RBC QN AUTO: 22.6 PG (ref 28–32)
MCHC RBC AUTO-ENTMCNC: 29.7 G/DL (ref 31–37)
MCV RBC AUTO: 76.1 FL (ref 81–99)
MICROCYTES BLD QL SMEAR: (no result)
MONOCYTES NFR BLD MANUAL: 9 % (ref 2–8)
NEUTS SEG NFR BLD MANUAL: 69 % (ref 45–75)
PLATELET # BLD AUTO: 189 X1000/UL (ref 130–400)
PLATELET # BLD EST: NORMAL 10*3/UL
PMV BLD AUTO: 7.8 FL (ref 7.4–10.4)
POTASSIUM SERPL-SCNC: 3.2 MEQ/L (ref 3.5–5.1)
PROT SERPL-MCNC: 6.9 G/DL (ref 6–8.3)
PROTHROMBIN TIME: 11.5 SEC (ref 9.6–11)
RBC # BLD AUTO: 4.24 MILL/UL (ref 4.2–5.4)
SODIUM SERPL-SCNC: 139 MEQ/L (ref 136–145)
WBC # BLD: 4.9 X1000/UL (ref 4.5–11)

## 2024-07-18 PROCEDURE — B211YZZ FLUOROSCOPY OF MULTIPLE CORONARY ARTERIES USING OTHER CONTRAST: ICD-10-PCS | Performed by: INTERNAL MEDICINE

## 2024-07-18 PROCEDURE — 4A023N7 MEASUREMENT OF CARDIAC SAMPLING AND PRESSURE, LEFT HEART, PERCUTANEOUS APPROACH: ICD-10-PCS | Performed by: INTERNAL MEDICINE

## 2024-07-18 RX ADMIN — ALPRAZOLAM PRN MG: 0.25 TABLET ORAL at 01:47

## 2024-07-19 VITALS
RESPIRATION RATE: 16 BRPM | TEMPERATURE: 98 F | DIASTOLIC BLOOD PRESSURE: 51 MMHG | SYSTOLIC BLOOD PRESSURE: 118 MMHG | HEART RATE: 84 BPM

## 2024-07-19 VITALS
SYSTOLIC BLOOD PRESSURE: 123 MMHG | HEART RATE: 78 BPM | TEMPERATURE: 98.1 F | RESPIRATION RATE: 19 BRPM | DIASTOLIC BLOOD PRESSURE: 46 MMHG

## 2024-07-19 VITALS
RESPIRATION RATE: 16 BRPM | SYSTOLIC BLOOD PRESSURE: 137 MMHG | DIASTOLIC BLOOD PRESSURE: 63 MMHG | HEART RATE: 83 BPM | TEMPERATURE: 98 F

## 2024-07-19 VITALS
SYSTOLIC BLOOD PRESSURE: 138 MMHG | HEART RATE: 85 BPM | DIASTOLIC BLOOD PRESSURE: 59 MMHG | TEMPERATURE: 98 F | RESPIRATION RATE: 16 BRPM

## 2024-07-19 VITALS
DIASTOLIC BLOOD PRESSURE: 59 MMHG | TEMPERATURE: 97.6 F | RESPIRATION RATE: 20 BRPM | SYSTOLIC BLOOD PRESSURE: 138 MMHG | HEART RATE: 81 BPM

## 2024-07-19 VITALS
SYSTOLIC BLOOD PRESSURE: 130 MMHG | OXYGEN SATURATION: 98 % | HEART RATE: 80 BPM | TEMPERATURE: 98 F | DIASTOLIC BLOOD PRESSURE: 70 MMHG

## 2024-07-19 VITALS — HEART RATE: 80 BPM

## 2024-07-19 LAB
ALBUMIN SERPL BCP-MCNC: 3.6 G/DL (ref 3.2–4.8)
ALT SERPL W P-5'-P-CCNC: 8 IU/L (ref 10–49)
ANISOCYTOSIS BLD QL: (no result)
AST SERPL W P-5'-P-CCNC: 25 IU/L (ref ?–34)
BILIRUB SERPL-MCNC: 0.9 MG/DL (ref 0.1–1)
BUN SERPL-MCNC: 10 MG/DL (ref 9–23)
CALCIUM SERPL-MCNC: 8.6 MG/DL (ref 8.7–10.4)
CHLORIDE SERPL-SCNC: 107 MEQ/L (ref 98–107)
CHLORIDE SERPL-SCNC: 108 MEQ/L (ref 98–107)
CO2 SERPL-SCNC: 26 MEQ/L (ref 21–32)
CO2 SERPL-SCNC: 26 MEQ/L (ref 21–32)
CREAT SERPL-MCNC: 1 MG/DL (ref 0.6–1)
ERYTHROCYTE [DISTWIDTH] IN BLOOD BY AUTOMATED COUNT: 19.8 % (ref 11.6–14.6)
GLUCOSE SERPL-MCNC: 81 MG/DL (ref 70–105)
HCT VFR BLD AUTO: 29.7 % (ref 36–48)
HGB BLD-MCNC: 8.9 G/DL (ref 12–16)
HYPOCHROMIA BLD QL: (no result)
LYMPHOCYTES NFR BLD MANUAL: 21 % (ref 20–60)
MANUAL DIF COMMENT BLD-IMP: 1
MCH RBC QN AUTO: 22.5 PG (ref 28–32)
MCHC RBC AUTO-ENTMCNC: 29.9 G/DL (ref 31–37)
MCV RBC AUTO: 75.1 FL (ref 81–99)
MICROCYTES BLD QL SMEAR: (no result)
MONOCYTES NFR BLD MANUAL: 9 % (ref 2–8)
NEUTS BAND NFR BLD MANUAL: 1 % (ref 1–6)
NEUTS SEG NFR BLD MANUAL: 69 % (ref 45–75)
PLATELET # BLD AUTO: 178 X1000/UL (ref 130–400)
PLATELET # BLD EST: NORMAL 10*3/UL
PMV BLD AUTO: 7.8 FL (ref 7.4–10.4)
POTASSIUM SERPL-SCNC: 3 MEQ/L (ref 3.5–5.1)
POTASSIUM SERPL-SCNC: 4 MEQ/L (ref 3.5–5.1)
PROT SERPL-MCNC: 6.3 G/DL (ref 6–8.3)
RBC # BLD AUTO: 3.95 MILL/UL (ref 4.2–5.4)
SODIUM SERPL-SCNC: 140 MEQ/L (ref 136–145)
SODIUM SERPL-SCNC: 141 MEQ/L (ref 136–145)
WBC # BLD: 5.1 X1000/UL (ref 4.5–11)

## 2024-07-19 RX ADMIN — ZOLPIDEM TARTRATE PRN MG: 5 TABLET ORAL at 04:18

## 2024-07-19 RX ADMIN — MAGNESIUM SULFATE IN WATER SCH MLS/HR: 40 INJECTION, SOLUTION INTRAVENOUS at 16:36

## 2024-07-19 RX ADMIN — DILTIAZEM HYDROCHLORIDE SCH MG: 60 TABLET, FILM COATED ORAL at 02:00

## 2024-07-19 RX ADMIN — POTASSIUM CHLORIDE SCH MEQ: 20 TABLET, EXTENDED RELEASE ORAL at 10:46

## 2024-07-25 ENCOUNTER — HOSPITAL ENCOUNTER (INPATIENT)
Dept: HOSPITAL 87 - ER | Age: 64
LOS: 8 days | Discharge: NURSING, REHAB OR LONG-TERM CARE | DRG: 264 | End: 2024-08-02
Attending: INTERNAL MEDICINE | Admitting: INTERNAL MEDICINE
Payer: MEDICARE

## 2024-07-25 VITALS — BODY MASS INDEX: 28.32 KG/M2 | HEIGHT: 65 IN | WEIGHT: 170 LBS

## 2024-07-25 VITALS
TEMPERATURE: 97.7 F | RESPIRATION RATE: 20 BRPM | OXYGEN SATURATION: 95 % | DIASTOLIC BLOOD PRESSURE: 77 MMHG | SYSTOLIC BLOOD PRESSURE: 181 MMHG

## 2024-07-25 VITALS
SYSTOLIC BLOOD PRESSURE: 155 MMHG | RESPIRATION RATE: 19 BRPM | TEMPERATURE: 97.4 F | DIASTOLIC BLOOD PRESSURE: 71 MMHG | HEART RATE: 83 BPM

## 2024-07-25 DIAGNOSIS — F32.A: ICD-10-CM

## 2024-07-25 DIAGNOSIS — Z95.0: ICD-10-CM

## 2024-07-25 DIAGNOSIS — I50.33: ICD-10-CM

## 2024-07-25 DIAGNOSIS — Z87.891: ICD-10-CM

## 2024-07-25 DIAGNOSIS — F41.9: ICD-10-CM

## 2024-07-25 DIAGNOSIS — I11.0: ICD-10-CM

## 2024-07-25 DIAGNOSIS — D63.8: ICD-10-CM

## 2024-07-25 DIAGNOSIS — D68.32: ICD-10-CM

## 2024-07-25 DIAGNOSIS — I25.10: ICD-10-CM

## 2024-07-25 DIAGNOSIS — Z79.01: ICD-10-CM

## 2024-07-25 DIAGNOSIS — I71.21: ICD-10-CM

## 2024-07-25 DIAGNOSIS — T45.515A: ICD-10-CM

## 2024-07-25 DIAGNOSIS — J44.9: ICD-10-CM

## 2024-07-25 DIAGNOSIS — J96.01: ICD-10-CM

## 2024-07-25 DIAGNOSIS — I08.0: Primary | ICD-10-CM

## 2024-07-25 DIAGNOSIS — I69.354: ICD-10-CM

## 2024-07-25 DIAGNOSIS — K92.2: ICD-10-CM

## 2024-07-25 DIAGNOSIS — I48.0: ICD-10-CM
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
## 2024-07-25 DIAGNOSIS — Z95.3: ICD-10-CM

## 2024-07-25 LAB
ANISOCYTOSIS BLD QL: (no result)
APTT PPP: 28.9 SEC (ref 23.4–31)
BUN SERPL-MCNC: 11 MG/DL (ref 9–23)
CALCIUM SERPL-MCNC: 9.2 MG/DL (ref 8.7–10.4)
CARDIAC TROPONIN I PNL SERPL HS: 16 NG/L (ref 3–34)
CHLORIDE SERPL-SCNC: 109 MEQ/L (ref 98–107)
CO2 SERPL-SCNC: 24 MEQ/L (ref 21–32)
CREAT SERPL-MCNC: 1 MG/DL (ref 0.6–1)
D DIMER PPP FEU-MCNC: 1.16 MG/L FEU (ref ?–0.5)
ERYTHROCYTE [DISTWIDTH] IN BLOOD BY AUTOMATED COUNT: 21.9 % (ref 11.6–14.6)
GLUCOSE SERPL-MCNC: 93 MG/DL (ref 70–105)
HCT VFR BLD AUTO: 32.1 % (ref 36–48)
HGB BLD-MCNC: 9.6 G/DL (ref 12–16)
HYPOCHROMIA BLD QL: (no result)
INR PPP: 1.2
LYMPHOCYTES NFR BLD MANUAL: 13 % (ref 20–60)
MANUAL DIF COMMENT BLD-IMP: 1
MCH RBC QN AUTO: 22.6 PG (ref 28–32)
MCHC RBC AUTO-ENTMCNC: 29.8 G/DL (ref 31–37)
MCV RBC AUTO: 76 FL (ref 81–99)
MICROCYTES BLD QL SMEAR: (no result)
MONOCYTES NFR BLD MANUAL: 11 % (ref 2–8)
NEUTS BAND NFR BLD MANUAL: 2 % (ref 1–6)
NEUTS SEG NFR BLD MANUAL: 74 % (ref 45–75)
PLATELET # BLD AUTO: 178 X1000/UL (ref 130–400)
PLATELET # BLD EST: NORMAL 10*3/UL
PMV BLD AUTO: 7.8 FL (ref 7.4–10.4)
POTASSIUM SERPL-SCNC: 4.6 MEQ/L (ref 3.5–5.1)
PROTHROMBIN TIME: 12.8 SEC (ref 9.6–11)
RBC # BLD AUTO: 4.23 MILL/UL (ref 4.2–5.4)
SODIUM SERPL-SCNC: 139 MEQ/L (ref 136–145)
WBC # BLD: 3.1 X1000/UL (ref 4.5–11)

## 2024-07-25 PROCEDURE — 84132 ASSAY OF SERUM POTASSIUM: CPT

## 2024-07-25 PROCEDURE — 82962 GLUCOSE BLOOD TEST: CPT

## 2024-07-25 PROCEDURE — 85347 COAGULATION TIME ACTIVATED: CPT

## 2024-07-25 PROCEDURE — 83735 ASSAY OF MAGNESIUM: CPT

## 2024-07-25 PROCEDURE — C1713 ANCHOR/SCREW BN/BN,TIS/BN: HCPCS

## 2024-07-25 PROCEDURE — 85014 HEMATOCRIT: CPT

## 2024-07-25 PROCEDURE — 85379 FIBRIN DEGRADATION QUANT: CPT

## 2024-07-25 PROCEDURE — 93970 EXTREMITY STUDY: CPT

## 2024-07-25 PROCEDURE — 86900 BLOOD TYPING SEROLOGIC ABO: CPT

## 2024-07-25 PROCEDURE — 99291 CRITICAL CARE FIRST HOUR: CPT

## 2024-07-25 PROCEDURE — 36415 COLL VENOUS BLD VENIPUNCTURE: CPT

## 2024-07-25 PROCEDURE — 84100 ASSAY OF PHOSPHORUS: CPT

## 2024-07-25 PROCEDURE — 82375 ASSAY CARBOXYHB QUANT: CPT

## 2024-07-25 PROCEDURE — 94640 AIRWAY INHALATION TREATMENT: CPT

## 2024-07-25 PROCEDURE — 85025 COMPLETE CBC W/AUTO DIFF WBC: CPT

## 2024-07-25 PROCEDURE — 71045 X-RAY EXAM CHEST 1 VIEW: CPT

## 2024-07-25 PROCEDURE — 97162 PT EVAL MOD COMPLEX 30 MIN: CPT

## 2024-07-25 PROCEDURE — 85384 FIBRINOGEN ACTIVITY: CPT

## 2024-07-25 PROCEDURE — 87340 HEPATITIS B SURFACE AG IA: CPT

## 2024-07-25 PROCEDURE — 97116 GAIT TRAINING THERAPY: CPT

## 2024-07-25 PROCEDURE — 80053 COMPREHEN METABOLIC PANEL: CPT

## 2024-07-25 PROCEDURE — 85018 HEMOGLOBIN: CPT

## 2024-07-25 PROCEDURE — 97530 THERAPEUTIC ACTIVITIES: CPT

## 2024-07-25 PROCEDURE — 83880 ASSAY OF NATRIURETIC PEPTIDE: CPT

## 2024-07-25 PROCEDURE — 86920 COMPATIBILITY TEST SPIN: CPT

## 2024-07-25 PROCEDURE — 93005 ELECTROCARDIOGRAM TRACING: CPT

## 2024-07-25 PROCEDURE — 71275 CT ANGIOGRAPHY CHEST: CPT

## 2024-07-25 PROCEDURE — 97167 OT EVAL HIGH COMPLEX 60 MIN: CPT

## 2024-07-25 PROCEDURE — 94660 CPAP INITIATION&MGMT: CPT

## 2024-07-25 PROCEDURE — 84484 ASSAY OF TROPONIN QUANT: CPT

## 2024-07-25 PROCEDURE — 85049 AUTOMATED PLATELET COUNT: CPT

## 2024-07-25 PROCEDURE — 86705 HEP B CORE ANTIBODY IGM: CPT

## 2024-07-25 PROCEDURE — 97535 SELF CARE MNGMENT TRAINING: CPT

## 2024-07-25 PROCEDURE — 86850 RBC ANTIBODY SCREEN: CPT

## 2024-07-25 PROCEDURE — 97112 NEUROMUSCULAR REEDUCATION: CPT

## 2024-07-25 PROCEDURE — 80048 BASIC METABOLIC PNL TOTAL CA: CPT

## 2024-07-25 PROCEDURE — 86870 RBC ANTIBODY IDENTIFICATION: CPT

## 2024-07-25 PROCEDURE — 36600 WITHDRAWAL OF ARTERIAL BLOOD: CPT

## 2024-07-25 PROCEDURE — 82805 BLOOD GASES W/O2 SATURATION: CPT

## 2024-07-25 RX ADMIN — DILTIAZEM HYDROCHLORIDE SCH MG: 60 TABLET, FILM COATED ORAL at 23:17

## 2024-07-25 RX ADMIN — HYDROCODONE BITARTRATE AND ACETAMINOPHEN PRN TAB: 10; 325 TABLET ORAL at 23:18

## 2024-07-25 RX ADMIN — SODIUM CHLORIDE PRN MG: 9 INJECTION, SOLUTION INTRAVENOUS at 21:27

## 2024-07-25 RX ADMIN — FUROSEMIDE ONE MG: 10 INJECTION, SOLUTION INTRAMUSCULAR; INTRAVENOUS at 14:28

## 2024-07-25 RX ADMIN — WARFARIN SODIUM NR MG: 3 TABLET ORAL at 23:17

## 2024-07-26 VITALS
HEART RATE: 84 BPM | SYSTOLIC BLOOD PRESSURE: 141 MMHG | TEMPERATURE: 97 F | DIASTOLIC BLOOD PRESSURE: 89 MMHG | RESPIRATION RATE: 18 BRPM

## 2024-07-26 VITALS
HEART RATE: 83 BPM | RESPIRATION RATE: 18 BRPM | SYSTOLIC BLOOD PRESSURE: 133 MMHG | DIASTOLIC BLOOD PRESSURE: 83 MMHG | TEMPERATURE: 97.7 F

## 2024-07-26 VITALS
RESPIRATION RATE: 19 BRPM | SYSTOLIC BLOOD PRESSURE: 120 MMHG | DIASTOLIC BLOOD PRESSURE: 64 MMHG | HEART RATE: 80 BPM | TEMPERATURE: 97.5 F

## 2024-07-26 VITALS
DIASTOLIC BLOOD PRESSURE: 54 MMHG | RESPIRATION RATE: 18 BRPM | SYSTOLIC BLOOD PRESSURE: 135 MMHG | HEART RATE: 84 BPM | TEMPERATURE: 97.2 F

## 2024-07-26 VITALS
SYSTOLIC BLOOD PRESSURE: 155 MMHG | TEMPERATURE: 97.4 F | DIASTOLIC BLOOD PRESSURE: 71 MMHG | RESPIRATION RATE: 18 BRPM | HEART RATE: 83 BPM

## 2024-07-26 VITALS
TEMPERATURE: 97.4 F | DIASTOLIC BLOOD PRESSURE: 64 MMHG | HEART RATE: 89 BPM | RESPIRATION RATE: 19 BRPM | SYSTOLIC BLOOD PRESSURE: 112 MMHG

## 2024-07-26 VITALS
TEMPERATURE: 97.1 F | DIASTOLIC BLOOD PRESSURE: 63 MMHG | SYSTOLIC BLOOD PRESSURE: 141 MMHG | HEART RATE: 78 BPM | RESPIRATION RATE: 18 BRPM

## 2024-07-26 LAB
HBV SURFACE AB SER-ACNC: NEGATIVE M[IU]/ML
HCV AB SER QL: (no result)
INR PPP: 1.3
PROTHROMBIN TIME: 14 SEC (ref 9.6–11)

## 2024-07-26 RX ADMIN — ZOLPIDEM TARTRATE PRN MG: 5 TABLET ORAL at 02:34

## 2024-07-26 RX ADMIN — IPRATROPIUM BROMIDE AND ALBUTEROL SULFATE SCH ML: .5; 3 SOLUTION RESPIRATORY (INHALATION) at 18:00

## 2024-07-26 RX ADMIN — EPOETIN ALFA-EPBX NR UNIT: 10000 INJECTION, SOLUTION INTRAVENOUS; SUBCUTANEOUS at 21:44

## 2024-07-26 RX ADMIN — Medication SCH ML: at 02:35

## 2024-07-26 RX ADMIN — ALLOPURINOL SCH MG: 300 TABLET ORAL at 09:00

## 2024-07-26 RX ADMIN — LOSARTAN POTASSIUM SCH MG: 50 TABLET ORAL at 10:22

## 2024-07-26 RX ADMIN — AMIODARONE HYDROCHLORIDE SCH MG: 200 TABLET ORAL at 10:22

## 2024-07-26 RX ADMIN — WARFARIN SODIUM SCH MG: 3 TABLET ORAL at 17:07

## 2024-07-26 RX ADMIN — IRON SUPPLEMENT SCH MG: 325 TABLET ORAL at 10:22

## 2024-07-26 RX ADMIN — ATORVASTATIN CALCIUM SCH MG: 10 TABLET, FILM COATED ORAL at 21:43

## 2024-07-26 RX ADMIN — DIPHENHYDRAMINE HYDROCHLORIDE PRN MG: 50 INJECTION, SOLUTION INTRAMUSCULAR; INTRAVENOUS at 12:16

## 2024-07-27 VITALS
SYSTOLIC BLOOD PRESSURE: 138 MMHG | RESPIRATION RATE: 18 BRPM | HEART RATE: 82 BPM | DIASTOLIC BLOOD PRESSURE: 64 MMHG | TEMPERATURE: 97.6 F

## 2024-07-27 VITALS — HEART RATE: 89 BPM | OXYGEN SATURATION: 91 % | RESPIRATION RATE: 18 BRPM

## 2024-07-27 VITALS
HEART RATE: 81 BPM | TEMPERATURE: 97.9 F | SYSTOLIC BLOOD PRESSURE: 111 MMHG | DIASTOLIC BLOOD PRESSURE: 60 MMHG | RESPIRATION RATE: 16 BRPM

## 2024-07-27 VITALS
RESPIRATION RATE: 18 BRPM | HEART RATE: 83 BPM | DIASTOLIC BLOOD PRESSURE: 63 MMHG | TEMPERATURE: 97.4 F | OXYGEN SATURATION: 90 % | SYSTOLIC BLOOD PRESSURE: 120 MMHG

## 2024-07-27 VITALS
TEMPERATURE: 97.6 F | HEART RATE: 83 BPM | RESPIRATION RATE: 18 BRPM | DIASTOLIC BLOOD PRESSURE: 72 MMHG | SYSTOLIC BLOOD PRESSURE: 125 MMHG

## 2024-07-27 VITALS
DIASTOLIC BLOOD PRESSURE: 67 MMHG | TEMPERATURE: 97.6 F | HEART RATE: 81 BPM | RESPIRATION RATE: 18 BRPM | SYSTOLIC BLOOD PRESSURE: 114 MMHG

## 2024-07-27 VITALS — OXYGEN SATURATION: 91 % | RESPIRATION RATE: 19 BRPM | HEART RATE: 78 BPM

## 2024-07-27 VITALS
RESPIRATION RATE: 18 BRPM | DIASTOLIC BLOOD PRESSURE: 73 MMHG | SYSTOLIC BLOOD PRESSURE: 128 MMHG | TEMPERATURE: 97.4 F | HEART RATE: 82 BPM

## 2024-07-27 LAB
INR PPP: 1.5
PROTHROMBIN TIME: 16 SEC (ref 9.6–11)

## 2024-07-27 RX ADMIN — IOHEXOL ONE ML: 350 INJECTION, SOLUTION INTRAVENOUS at 21:18

## 2024-07-27 RX ADMIN — WARFARIN SODIUM SCH MG: 3 TABLET ORAL at 17:27

## 2024-07-28 VITALS
RESPIRATION RATE: 20 BRPM | DIASTOLIC BLOOD PRESSURE: 58 MMHG | TEMPERATURE: 97.1 F | SYSTOLIC BLOOD PRESSURE: 125 MMHG | HEART RATE: 74 BPM

## 2024-07-28 VITALS
TEMPERATURE: 97.8 F | SYSTOLIC BLOOD PRESSURE: 124 MMHG | DIASTOLIC BLOOD PRESSURE: 57 MMHG | HEART RATE: 93 BPM | RESPIRATION RATE: 18 BRPM

## 2024-07-28 VITALS
TEMPERATURE: 98.1 F | HEART RATE: 75 BPM | DIASTOLIC BLOOD PRESSURE: 48 MMHG | SYSTOLIC BLOOD PRESSURE: 117 MMHG | RESPIRATION RATE: 18 BRPM

## 2024-07-28 VITALS — OXYGEN SATURATION: 98 % | HEART RATE: 77 BPM | RESPIRATION RATE: 16 BRPM

## 2024-07-28 VITALS
TEMPERATURE: 98 F | DIASTOLIC BLOOD PRESSURE: 56 MMHG | SYSTOLIC BLOOD PRESSURE: 122 MMHG | RESPIRATION RATE: 18 BRPM | HEART RATE: 88 BPM

## 2024-07-28 VITALS — OXYGEN SATURATION: 99 % | HEART RATE: 74 BPM | RESPIRATION RATE: 14 BRPM

## 2024-07-28 VITALS
TEMPERATURE: 97.8 F | DIASTOLIC BLOOD PRESSURE: 52 MMHG | SYSTOLIC BLOOD PRESSURE: 120 MMHG | HEART RATE: 75 BPM | RESPIRATION RATE: 20 BRPM

## 2024-07-28 VITALS — HEART RATE: 83 BPM | OXYGEN SATURATION: 98 % | RESPIRATION RATE: 16 BRPM

## 2024-07-28 VITALS
RESPIRATION RATE: 20 BRPM | DIASTOLIC BLOOD PRESSURE: 62 MMHG | TEMPERATURE: 97.5 F | SYSTOLIC BLOOD PRESSURE: 114 MMHG | HEART RATE: 82 BPM

## 2024-07-28 LAB
INR PPP: 1.7
PROTHROMBIN TIME: 18.6 SEC (ref 9.6–11)

## 2024-07-28 RX ADMIN — DOCUSATE SODIUM PRN MG: 100 CAPSULE, LIQUID FILLED ORAL at 20:56

## 2024-07-29 VITALS
TEMPERATURE: 97.5 F | HEART RATE: 103 BPM | RESPIRATION RATE: 18 BRPM | SYSTOLIC BLOOD PRESSURE: 116 MMHG | DIASTOLIC BLOOD PRESSURE: 53 MMHG

## 2024-07-29 VITALS
RESPIRATION RATE: 18 BRPM | DIASTOLIC BLOOD PRESSURE: 80 MMHG | HEART RATE: 100 BPM | SYSTOLIC BLOOD PRESSURE: 152 MMHG | TEMPERATURE: 100 F

## 2024-07-29 VITALS — HEART RATE: 103 BPM | OXYGEN SATURATION: 93 % | RESPIRATION RATE: 20 BRPM

## 2024-07-29 VITALS
RESPIRATION RATE: 18 BRPM | TEMPERATURE: 97.8 F | SYSTOLIC BLOOD PRESSURE: 143 MMHG | HEART RATE: 94 BPM | DIASTOLIC BLOOD PRESSURE: 63 MMHG

## 2024-07-29 VITALS — OXYGEN SATURATION: 90 % | HEART RATE: 99 BPM | RESPIRATION RATE: 16 BRPM

## 2024-07-29 VITALS
DIASTOLIC BLOOD PRESSURE: 52 MMHG | RESPIRATION RATE: 16 BRPM | TEMPERATURE: 97.4 F | SYSTOLIC BLOOD PRESSURE: 112 MMHG | HEART RATE: 96 BPM

## 2024-07-29 VITALS
RESPIRATION RATE: 20 BRPM | DIASTOLIC BLOOD PRESSURE: 69 MMHG | TEMPERATURE: 97.1 F | HEART RATE: 109 BPM | SYSTOLIC BLOOD PRESSURE: 138 MMHG

## 2024-07-29 LAB
ALBUMIN SERPL BCP-MCNC: 4.3 G/DL (ref 3.2–4.8)
ALT SERPL W P-5'-P-CCNC: 13 IU/L (ref 10–49)
ANISOCYTOSIS BLD QL: (no result)
AST SERPL W P-5'-P-CCNC: 27 IU/L (ref ?–34)
BASOPHILS NFR BLD AUTO: 0.5 % (ref 0–2)
BILIRUB SERPL-MCNC: 0.6 MG/DL (ref 0.1–1)
BUN SERPL-MCNC: 19 MG/DL (ref 9–23)
CALCIUM SERPL-MCNC: 9.1 MG/DL (ref 8.7–10.4)
CHLORIDE SERPL-SCNC: 109 MEQ/L (ref 98–107)
CO2 SERPL-SCNC: 24 MEQ/L (ref 21–32)
CREAT SERPL-MCNC: 1 MG/DL (ref 0.6–1)
EOSINOPHIL NFR BLD AUTO: 1.4 % (ref 0–5)
ERYTHROCYTE [DISTWIDTH] IN BLOOD BY AUTOMATED COUNT: 22.2 % (ref 11.6–14.6)
GLUCOSE SERPL-MCNC: 74 MG/DL (ref 70–105)
HCT VFR BLD AUTO: 32.9 % (ref 36–48)
HGB BLD-MCNC: 9.5 G/DL (ref 12–16)
HYPOCHROMIA BLD QL: (no result)
INR PPP: 1.7
LYMPHOCYTES NFR BLD AUTO: 12.2 % (ref 20–50)
MANUAL DIF COMMENT BLD-IMP: 1
MCH RBC QN AUTO: 22.2 PG (ref 28–32)
MCHC RBC AUTO-ENTMCNC: 28.9 G/DL (ref 31–37)
MCV RBC AUTO: 76.7 FL (ref 81–99)
MICROCYTES BLD QL SMEAR: (no result)
MONOCYTES NFR BLD AUTO: 11 % (ref 2–8)
NEUTROPHILS NFR BLD AUTO: 74.9 % (ref 40–76)
PLATELET # BLD AUTO: 254 X1000/UL (ref 130–400)
PLATELET # BLD EST: NORMAL 10*3/UL
PMV BLD AUTO: 7.5 FL (ref 7.4–10.4)
POTASSIUM SERPL-SCNC: 4.2 MEQ/L (ref 3.5–5.1)
PROT SERPL-MCNC: 7.5 G/DL (ref 6–8.3)
PROTHROMBIN TIME: 18.5 SEC (ref 9.6–11)
RBC # BLD AUTO: 4.3 MILL/UL (ref 4.2–5.4)
RBC MORPH BLD: YES
SODIUM SERPL-SCNC: 140 MEQ/L (ref 136–145)
WBC # BLD: 6.6 X1000/UL (ref 4.5–11)

## 2024-07-29 RX ADMIN — ALPRAZOLAM PRN MG: 0.25 TABLET ORAL at 18:48

## 2024-07-29 RX ADMIN — ACETAMINOPHEN PRN MG: 325 TABLET, FILM COATED ORAL at 23:53

## 2024-07-29 RX ADMIN — DOCUSATE SODIUM SCH MG: 100 CAPSULE, LIQUID FILLED ORAL at 21:58

## 2024-07-29 RX ADMIN — OXYCODONE HYDROCHLORIDE AND ACETAMINOPHEN SCH MG: 500 TABLET ORAL at 22:00

## 2024-07-29 RX ADMIN — CHLORHEXIDINE GLUCONATE SCH APP: 213 SOLUTION TOPICAL at 21:55

## 2024-07-29 RX ADMIN — ALLOPURINOL SCH MG: 300 TABLET ORAL at 22:04

## 2024-07-29 RX ADMIN — ENOXAPARIN SODIUM NR MG: 80 INJECTION SUBCUTANEOUS at 14:45

## 2024-07-30 VITALS — RESPIRATION RATE: 21 BRPM | DIASTOLIC BLOOD PRESSURE: 71 MMHG | SYSTOLIC BLOOD PRESSURE: 153 MMHG | HEART RATE: 75 BPM

## 2024-07-30 VITALS — DIASTOLIC BLOOD PRESSURE: 69 MMHG | RESPIRATION RATE: 21 BRPM | HEART RATE: 79 BPM | SYSTOLIC BLOOD PRESSURE: 154 MMHG

## 2024-07-30 VITALS — HEART RATE: 108 BPM | SYSTOLIC BLOOD PRESSURE: 110 MMHG | RESPIRATION RATE: 22 BRPM | DIASTOLIC BLOOD PRESSURE: 54 MMHG

## 2024-07-30 VITALS
HEART RATE: 103 BPM | DIASTOLIC BLOOD PRESSURE: 62 MMHG | RESPIRATION RATE: 20 BRPM | TEMPERATURE: 101.7 F | SYSTOLIC BLOOD PRESSURE: 157 MMHG

## 2024-07-30 VITALS — DIASTOLIC BLOOD PRESSURE: 57 MMHG | HEART RATE: 105 BPM | RESPIRATION RATE: 24 BRPM | SYSTOLIC BLOOD PRESSURE: 120 MMHG

## 2024-07-30 VITALS — SYSTOLIC BLOOD PRESSURE: 119 MMHG | HEART RATE: 87 BPM | DIASTOLIC BLOOD PRESSURE: 86 MMHG | RESPIRATION RATE: 23 BRPM

## 2024-07-30 VITALS — HEART RATE: 79 BPM | RESPIRATION RATE: 26 BRPM | SYSTOLIC BLOOD PRESSURE: 118 MMHG | DIASTOLIC BLOOD PRESSURE: 58 MMHG

## 2024-07-30 VITALS — DIASTOLIC BLOOD PRESSURE: 73 MMHG | SYSTOLIC BLOOD PRESSURE: 145 MMHG | HEART RATE: 106 BPM | RESPIRATION RATE: 18 BRPM

## 2024-07-30 VITALS — RESPIRATION RATE: 15 BRPM | DIASTOLIC BLOOD PRESSURE: 77 MMHG | SYSTOLIC BLOOD PRESSURE: 160 MMHG | HEART RATE: 101 BPM

## 2024-07-30 VITALS — DIASTOLIC BLOOD PRESSURE: 59 MMHG | RESPIRATION RATE: 27 BRPM | HEART RATE: 74 BPM | SYSTOLIC BLOOD PRESSURE: 123 MMHG

## 2024-07-30 VITALS — DIASTOLIC BLOOD PRESSURE: 62 MMHG | RESPIRATION RATE: 13 BRPM | SYSTOLIC BLOOD PRESSURE: 136 MMHG | HEART RATE: 99 BPM

## 2024-07-30 VITALS — HEART RATE: 106 BPM | RESPIRATION RATE: 29 BRPM | OXYGEN SATURATION: 99 %

## 2024-07-30 VITALS — HEART RATE: 73 BPM | SYSTOLIC BLOOD PRESSURE: 162 MMHG | RESPIRATION RATE: 18 BRPM | DIASTOLIC BLOOD PRESSURE: 69 MMHG

## 2024-07-30 VITALS — RESPIRATION RATE: 27 BRPM | HEART RATE: 76 BPM | SYSTOLIC BLOOD PRESSURE: 152 MMHG | DIASTOLIC BLOOD PRESSURE: 66 MMHG

## 2024-07-30 VITALS — RESPIRATION RATE: 18 BRPM | SYSTOLIC BLOOD PRESSURE: 130 MMHG | DIASTOLIC BLOOD PRESSURE: 65 MMHG | HEART RATE: 74 BPM

## 2024-07-30 VITALS — DIASTOLIC BLOOD PRESSURE: 71 MMHG | RESPIRATION RATE: 15 BRPM | HEART RATE: 82 BPM | SYSTOLIC BLOOD PRESSURE: 133 MMHG

## 2024-07-30 VITALS
HEART RATE: 91 BPM | RESPIRATION RATE: 18 BRPM | DIASTOLIC BLOOD PRESSURE: 56 MMHG | TEMPERATURE: 99 F | SYSTOLIC BLOOD PRESSURE: 119 MMHG

## 2024-07-30 VITALS
DIASTOLIC BLOOD PRESSURE: 70 MMHG | HEART RATE: 90 BPM | SYSTOLIC BLOOD PRESSURE: 138 MMHG | TEMPERATURE: 98.6 F | RESPIRATION RATE: 18 BRPM

## 2024-07-30 VITALS
DIASTOLIC BLOOD PRESSURE: 67 MMHG | TEMPERATURE: 98.3 F | RESPIRATION RATE: 16 BRPM | HEART RATE: 84 BPM | SYSTOLIC BLOOD PRESSURE: 148 MMHG

## 2024-07-30 VITALS
SYSTOLIC BLOOD PRESSURE: 125 MMHG | RESPIRATION RATE: 21 BRPM | HEART RATE: 86 BPM | DIASTOLIC BLOOD PRESSURE: 57 MMHG | TEMPERATURE: 95.4 F

## 2024-07-30 VITALS
SYSTOLIC BLOOD PRESSURE: 116 MMHG | TEMPERATURE: 97 F | DIASTOLIC BLOOD PRESSURE: 63 MMHG | RESPIRATION RATE: 17 BRPM | HEART RATE: 74 BPM

## 2024-07-30 VITALS — DIASTOLIC BLOOD PRESSURE: 72 MMHG | RESPIRATION RATE: 23 BRPM | HEART RATE: 80 BPM | SYSTOLIC BLOOD PRESSURE: 151 MMHG

## 2024-07-30 VITALS
RESPIRATION RATE: 13 BRPM | SYSTOLIC BLOOD PRESSURE: 151 MMHG | HEART RATE: 107 BPM | DIASTOLIC BLOOD PRESSURE: 88 MMHG | TEMPERATURE: 98.2 F

## 2024-07-30 VITALS
RESPIRATION RATE: 20 BRPM | SYSTOLIC BLOOD PRESSURE: 131 MMHG | HEART RATE: 75 BPM | TEMPERATURE: 98.1 F | DIASTOLIC BLOOD PRESSURE: 62 MMHG

## 2024-07-30 VITALS — SYSTOLIC BLOOD PRESSURE: 134 MMHG | DIASTOLIC BLOOD PRESSURE: 66 MMHG | RESPIRATION RATE: 17 BRPM | HEART RATE: 76 BPM

## 2024-07-30 VITALS — SYSTOLIC BLOOD PRESSURE: 152 MMHG | RESPIRATION RATE: 26 BRPM | HEART RATE: 72 BPM | DIASTOLIC BLOOD PRESSURE: 60 MMHG

## 2024-07-30 VITALS — HEART RATE: 87 BPM | SYSTOLIC BLOOD PRESSURE: 105 MMHG | DIASTOLIC BLOOD PRESSURE: 69 MMHG | RESPIRATION RATE: 26 BRPM

## 2024-07-30 VITALS — DIASTOLIC BLOOD PRESSURE: 56 MMHG | HEART RATE: 106 BPM | SYSTOLIC BLOOD PRESSURE: 121 MMHG | RESPIRATION RATE: 18 BRPM

## 2024-07-30 VITALS — RESPIRATION RATE: 24 BRPM | HEART RATE: 85 BPM | SYSTOLIC BLOOD PRESSURE: 130 MMHG | DIASTOLIC BLOOD PRESSURE: 55 MMHG

## 2024-07-30 VITALS
TEMPERATURE: 98.1 F | DIASTOLIC BLOOD PRESSURE: 62 MMHG | RESPIRATION RATE: 20 BRPM | HEART RATE: 75 BPM | SYSTOLIC BLOOD PRESSURE: 131 MMHG

## 2024-07-30 VITALS — DIASTOLIC BLOOD PRESSURE: 61 MMHG | HEART RATE: 80 BPM | RESPIRATION RATE: 16 BRPM | SYSTOLIC BLOOD PRESSURE: 119 MMHG

## 2024-07-30 VITALS — DIASTOLIC BLOOD PRESSURE: 53 MMHG | HEART RATE: 105 BPM | RESPIRATION RATE: 20 BRPM | SYSTOLIC BLOOD PRESSURE: 118 MMHG

## 2024-07-30 VITALS — HEART RATE: 107 BPM | DIASTOLIC BLOOD PRESSURE: 59 MMHG | SYSTOLIC BLOOD PRESSURE: 131 MMHG | RESPIRATION RATE: 18 BRPM

## 2024-07-30 VITALS — HEART RATE: 78 BPM | SYSTOLIC BLOOD PRESSURE: 158 MMHG | DIASTOLIC BLOOD PRESSURE: 70 MMHG | RESPIRATION RATE: 19 BRPM

## 2024-07-30 VITALS — DIASTOLIC BLOOD PRESSURE: 71 MMHG | SYSTOLIC BLOOD PRESSURE: 116 MMHG

## 2024-07-30 VITALS
SYSTOLIC BLOOD PRESSURE: 122 MMHG | HEART RATE: 78 BPM | RESPIRATION RATE: 19 BRPM | DIASTOLIC BLOOD PRESSURE: 77 MMHG | TEMPERATURE: 94.2 F

## 2024-07-30 VITALS — SYSTOLIC BLOOD PRESSURE: 161 MMHG | RESPIRATION RATE: 18 BRPM | HEART RATE: 83 BPM | DIASTOLIC BLOOD PRESSURE: 73 MMHG

## 2024-07-30 VITALS — RESPIRATION RATE: 47 BRPM

## 2024-07-30 VITALS — SYSTOLIC BLOOD PRESSURE: 135 MMHG | DIASTOLIC BLOOD PRESSURE: 68 MMHG | HEART RATE: 76 BPM | RESPIRATION RATE: 19 BRPM

## 2024-07-30 VITALS — HEART RATE: 78 BPM | SYSTOLIC BLOOD PRESSURE: 113 MMHG | DIASTOLIC BLOOD PRESSURE: 64 MMHG | RESPIRATION RATE: 22 BRPM

## 2024-07-30 VITALS — SYSTOLIC BLOOD PRESSURE: 147 MMHG | DIASTOLIC BLOOD PRESSURE: 70 MMHG | HEART RATE: 102 BPM | RESPIRATION RATE: 15 BRPM

## 2024-07-30 VITALS — RESPIRATION RATE: 30 BRPM | HEART RATE: 80 BPM | SYSTOLIC BLOOD PRESSURE: 108 MMHG | DIASTOLIC BLOOD PRESSURE: 65 MMHG

## 2024-07-30 VITALS — RESPIRATION RATE: 18 BRPM | HEART RATE: 77 BPM | OXYGEN SATURATION: 95 %

## 2024-07-30 VITALS — SYSTOLIC BLOOD PRESSURE: 123 MMHG | RESPIRATION RATE: 14 BRPM | HEART RATE: 74 BPM | DIASTOLIC BLOOD PRESSURE: 63 MMHG

## 2024-07-30 VITALS — HEART RATE: 73 BPM | RESPIRATION RATE: 25 BRPM | DIASTOLIC BLOOD PRESSURE: 77 MMHG | SYSTOLIC BLOOD PRESSURE: 157 MMHG

## 2024-07-30 VITALS — DIASTOLIC BLOOD PRESSURE: 68 MMHG | SYSTOLIC BLOOD PRESSURE: 160 MMHG | RESPIRATION RATE: 19 BRPM | HEART RATE: 99 BPM

## 2024-07-30 VITALS — SYSTOLIC BLOOD PRESSURE: 120 MMHG | HEART RATE: 106 BPM | DIASTOLIC BLOOD PRESSURE: 59 MMHG | RESPIRATION RATE: 22 BRPM

## 2024-07-30 VITALS — DIASTOLIC BLOOD PRESSURE: 72 MMHG | RESPIRATION RATE: 25 BRPM | HEART RATE: 77 BPM | SYSTOLIC BLOOD PRESSURE: 159 MMHG

## 2024-07-30 VITALS — SYSTOLIC BLOOD PRESSURE: 121 MMHG | RESPIRATION RATE: 17 BRPM | HEART RATE: 103 BPM | DIASTOLIC BLOOD PRESSURE: 62 MMHG

## 2024-07-30 VITALS — DIASTOLIC BLOOD PRESSURE: 75 MMHG | RESPIRATION RATE: 11 BRPM | HEART RATE: 96 BPM | SYSTOLIC BLOOD PRESSURE: 128 MMHG

## 2024-07-30 VITALS — HEART RATE: 105 BPM | SYSTOLIC BLOOD PRESSURE: 124 MMHG | RESPIRATION RATE: 21 BRPM | DIASTOLIC BLOOD PRESSURE: 62 MMHG

## 2024-07-30 VITALS — RESPIRATION RATE: 14 BRPM | HEART RATE: 94 BPM | SYSTOLIC BLOOD PRESSURE: 131 MMHG | DIASTOLIC BLOOD PRESSURE: 79 MMHG

## 2024-07-30 VITALS — DIASTOLIC BLOOD PRESSURE: 60 MMHG | HEART RATE: 108 BPM | RESPIRATION RATE: 20 BRPM | SYSTOLIC BLOOD PRESSURE: 126 MMHG

## 2024-07-30 VITALS
HEART RATE: 68 BPM | SYSTOLIC BLOOD PRESSURE: 148 MMHG | RESPIRATION RATE: 18 BRPM | DIASTOLIC BLOOD PRESSURE: 60 MMHG | TEMPERATURE: 97.5 F

## 2024-07-30 VITALS — SYSTOLIC BLOOD PRESSURE: 118 MMHG | HEART RATE: 76 BPM | DIASTOLIC BLOOD PRESSURE: 61 MMHG | RESPIRATION RATE: 19 BRPM

## 2024-07-30 VITALS — SYSTOLIC BLOOD PRESSURE: 130 MMHG | RESPIRATION RATE: 22 BRPM | HEART RATE: 90 BPM | DIASTOLIC BLOOD PRESSURE: 60 MMHG

## 2024-07-30 VITALS — HEART RATE: 96 BPM | SYSTOLIC BLOOD PRESSURE: 137 MMHG | DIASTOLIC BLOOD PRESSURE: 64 MMHG | RESPIRATION RATE: 19 BRPM

## 2024-07-30 VITALS
RESPIRATION RATE: 18 BRPM | DIASTOLIC BLOOD PRESSURE: 53 MMHG | TEMPERATURE: 98.1 F | SYSTOLIC BLOOD PRESSURE: 108 MMHG | HEART RATE: 75 BPM

## 2024-07-30 VITALS — DIASTOLIC BLOOD PRESSURE: 69 MMHG | HEART RATE: 76 BPM | RESPIRATION RATE: 20 BRPM | SYSTOLIC BLOOD PRESSURE: 147 MMHG

## 2024-07-30 VITALS — RESPIRATION RATE: 34 BRPM

## 2024-07-30 LAB
ANISOCYTOSIS BLD QL: (no result)
ANISOCYTOSIS BLD QL: (no result)
BASE EXCESS BLDA CALC-SCNC: -2.8 MMOL/L (ref -2–2)
BASE EXCESS BLDA CALC-SCNC: -2.9 MMOL/L (ref -2–2)
BASE EXCESS BLDA CALC-SCNC: -3.7 MMOL/L (ref -2–2)
BASOPHILS NFR BLD AUTO: 0.8 % (ref 0–2)
BG TOTAL RESPIRATORY RATE: 33 B/MIN
BG VENT RATE: 30 SET
BG VENT RATE: 30 SET
BUN SERPL-MCNC: 20 MG/DL (ref 9–23)
BUN SERPL-MCNC: 22 MG/DL (ref 9–23)
BUN SERPL-MCNC: 23 MG/DL (ref 9–23)
CALCIUM SERPL-MCNC: 8.2 MG/DL (ref 8.7–10.4)
CALCIUM SERPL-MCNC: 8.5 MG/DL (ref 8.7–10.4)
CALCIUM SERPL-MCNC: 8.6 MG/DL (ref 8.7–10.4)
CHLORIDE SERPL-SCNC: 106 MEQ/L (ref 98–107)
CHLORIDE SERPL-SCNC: 108 MEQ/L (ref 98–107)
CHLORIDE SERPL-SCNC: 110 MEQ/L (ref 98–107)
CO2 SERPL-SCNC: 22 MEQ/L (ref 21–32)
CO2 SERPL-SCNC: 23 MEQ/L (ref 21–32)
CO2 SERPL-SCNC: 26 MEQ/L (ref 21–32)
COHGB MFR BLDA: 1.3 % (ref 0.5–1.5)
COHGB MFR BLDA: 1.7 % (ref 0.5–1.5)
COHGB MFR BLDA: 1.9 % (ref 0.5–1.5)
CREAT SERPL-MCNC: 1 MG/DL (ref 0.6–1)
CREAT SERPL-MCNC: 1.2 MG/DL (ref 0.6–1)
CREAT SERPL-MCNC: 1.3 MG/DL (ref 0.6–1)
DO-HGB MFR BLDA: 1.4 % (ref 0–5)
DO-HGB MFR BLDA: 2.6 % (ref 0–5)
DO-HGB MFR BLDA: 32.5 % (ref 0–5)
EOSINOPHIL NFR BLD AUTO: 0.8 % (ref 0–5)
ERYTHROCYTE [DISTWIDTH] IN BLOOD BY AUTOMATED COUNT: 22.6 % (ref 11.6–14.6)
ERYTHROCYTE [DISTWIDTH] IN BLOOD BY AUTOMATED COUNT: 22.8 % (ref 11.6–14.6)
ERYTHROCYTE [DISTWIDTH] IN BLOOD BY AUTOMATED COUNT: 22.9 % (ref 11.6–14.6)
FIBRINOGEN PPP-MCNC: 373 MG/DL (ref 200–400)
GLUCOSE SERPL-MCNC: 103 MG/DL (ref 70–105)
GLUCOSE SERPL-MCNC: 123 MG/DL (ref 70–105)
GLUCOSE SERPL-MCNC: 131 MG/DL (ref 70–105)
HCO3 BLDA-SCNC: 22 MMOL/L (ref 22–26)
HCO3 BLDA-SCNC: 23.6 MMOL/L (ref 22–26)
HCO3 BLDA-SCNC: 25.9 MMOL/L (ref 22–26)
HCT VFR BLD AUTO: 32 % (ref 36–48)
HCT VFR BLD AUTO: 32.2 % (ref 36–48)
HCT VFR BLD AUTO: 32.5 % (ref 36–48)
HCT VFR BLD AUTO: 32.9 % (ref 36–48)
HGB BLD-MCNC: 9.4 G/DL (ref 12–16)
HGB BLD-MCNC: 9.5 G/DL (ref 12–16)
HGB BLD-MCNC: 9.5 G/DL (ref 12–16)
HGB BLD-MCNC: 9.6 G/DL (ref 12–16)
HGB BLDA OXIMETRY-MCNC: 10.6 G/DL (ref 12–18)
HGB BLDA OXIMETRY-MCNC: 10.9 G/DL (ref 12–18)
HGB BLDA OXIMETRY-MCNC: 11.1 G/DL (ref 12–18)
HYPOCHROMIA BLD QL: (no result)
HYPOCHROMIA BLD QL: (no result)
INHALED O2 CONCENTRATION: 100 %
INHALED O2 CONCENTRATION: 100 %
INHALED O2 CONCENTRATION: 80 %
INHALED O2 FLOW RATE: 15 L/MIN
INR PPP: 1.5
LYMPHOCYTES NFR BLD AUTO: 14.6 % (ref 20–50)
LYMPHOCYTES NFR BLD MANUAL: 2 % (ref 20–60)
LYMPHOCYTES NFR BLD MANUAL: 5 % (ref 20–60)
MANUAL DIF COMMENT BLD-IMP: 1
MCH RBC QN AUTO: 22.4 PG (ref 28–32)
MCH RBC QN AUTO: 22.5 PG (ref 28–32)
MCH RBC QN AUTO: 22.6 PG (ref 28–32)
MCHC RBC AUTO-ENTMCNC: 29.1 G/DL (ref 31–37)
MCHC RBC AUTO-ENTMCNC: 29.5 G/DL (ref 31–37)
MCHC RBC AUTO-ENTMCNC: 29.9 G/DL (ref 31–37)
MCV RBC AUTO: 75.6 FL (ref 81–99)
MCV RBC AUTO: 76 FL (ref 81–99)
MCV RBC AUTO: 77 FL (ref 81–99)
METHGB MFR BLDA: 0 % (ref 0–1.5)
METHGB MFR BLDA: 0.1 % (ref 0–1.5)
METHGB MFR BLDA: 0.3 % (ref 0–1.5)
MICROCYTES BLD QL SMEAR: (no result)
MICROCYTES BLD QL SMEAR: (no result)
MONOCYTES NFR BLD AUTO: 6.8 % (ref 2–8)
MONOCYTES NFR BLD MANUAL: 11 % (ref 2–8)
MONOCYTES NFR BLD MANUAL: 6 % (ref 2–8)
NEUTROPHILS NFR BLD AUTO: 77 % (ref 40–76)
NEUTS BAND NFR BLD MANUAL: 2 % (ref 1–6)
NEUTS SEG NFR BLD MANUAL: 87 % (ref 45–75)
NEUTS SEG NFR BLD MANUAL: 87 % (ref 45–75)
OXYHGB MFR BLDA: 65.6 % (ref 94–97)
OXYHGB MFR BLDA: 95.4 % (ref 94–97)
OXYHGB MFR BLDA: 97.2 % (ref 94–97)
PCO2 TEMP ADJ BLDA: 38.4 MMHG (ref 35–45)
PCO2 TEMP ADJ BLDA: 47.9 MMHG (ref 35–45)
PCO2 TEMP ADJ BLDA: 73.6 MMHG (ref 35–45)
PH TEMP ADJ BLDA: 7.16 [PH] (ref 7.35–7.45)
PH TEMP ADJ BLDA: 7.31 [PH] (ref 7.35–7.45)
PH TEMP ADJ BLDA: 7.38 [PH] (ref 7.35–7.45)
PHOSPHATE SERPL-MCNC: 2.7 MG/DL (ref 2.5–4.9)
PHOSPHATE SERPL-MCNC: 3.1 MG/DL (ref 2.5–4.9)
PLATELET # BLD AUTO: 217 X1000/UL (ref 130–400)
PLATELET # BLD AUTO: 229 X1000/UL (ref 130–400)
PLATELET # BLD AUTO: 234 X1000/UL (ref 130–400)
PLATELET # BLD AUTO: 256 X1000/UL (ref 130–400)
PLATELET # BLD EST: NORMAL 10*3/UL
PLATELET # BLD EST: NORMAL 10*3/UL
PMV BLD AUTO: 7.2 FL (ref 7.4–10.4)
PMV BLD AUTO: 7.3 FL (ref 7.4–10.4)
PMV BLD AUTO: 7.4 FL (ref 7.4–10.4)
PO2 TEMP ADJ BLDA: 113.6 MMHG (ref 75–100)
PO2 TEMP ADJ BLDA: 44.2 MMHG (ref 75–100)
PO2 TEMP ADJ BLDA: 98.5 MMHG (ref 75–100)
POTASSIUM SERPL-SCNC: 3.9 MEQ/L (ref 3.5–5.1)
POTASSIUM SERPL-SCNC: 4.1 MEQ/L (ref 3.5–5.1)
POTASSIUM SERPL-SCNC: 4.5 MEQ/L (ref 3.5–5.1)
PROTHROMBIN TIME: 16.4 SEC (ref 9.6–11)
RBC # BLD AUTO: 4.22 MILL/UL (ref 4.2–5.4)
RBC # BLD AUTO: 4.22 MILL/UL (ref 4.2–5.4)
RBC # BLD AUTO: 4.25 MILL/UL (ref 4.2–5.4)
RBC MORPH BLD: YES
SAO2 % BLDA: 66.9 % (ref 92–98.5)
SAO2 % BLDA: 97.3 % (ref 92–98.5)
SAO2 % BLDA: 98.6 % (ref 92–98.5)
SODIUM SERPL-SCNC: 136 MEQ/L (ref 136–145)
SODIUM SERPL-SCNC: 138 MEQ/L (ref 136–145)
SODIUM SERPL-SCNC: 140 MEQ/L (ref 136–145)
SPECIMEN DRAWN FROM PATIENT: (no result)
VENTILATION MODE VENT: (no result)
WBC # BLD: 11.2 X1000/UL (ref 4.5–11)
WBC # BLD: 8.9 X1000/UL (ref 4.5–11)
WBC # BLD: 9.7 X1000/UL (ref 4.5–11)

## 2024-07-30 PROCEDURE — 30233N1 TRANSFUSION OF NONAUTOLOGOUS RED BLOOD CELLS INTO PERIPHERAL VEIN, PERCUTANEOUS APPROACH: ICD-10-PCS | Performed by: INTERNAL MEDICINE

## 2024-07-30 PROCEDURE — 0BNN0ZZ RELEASE RIGHT PLEURA, OPEN APPROACH: ICD-10-PCS | Performed by: THORACIC SURGERY (CARDIOTHORACIC VASCULAR SURGERY)

## 2024-07-30 PROCEDURE — 0BNP0ZZ RELEASE LEFT PLEURA, OPEN APPROACH: ICD-10-PCS | Performed by: THORACIC SURGERY (CARDIOTHORACIC VASCULAR SURGERY)

## 2024-07-30 PROCEDURE — 0WJC0ZZ INSPECTION OF MEDIASTINUM, OPEN APPROACH: ICD-10-PCS | Performed by: THORACIC SURGERY (CARDIOTHORACIC VASCULAR SURGERY)

## 2024-07-30 PROCEDURE — 5A09357 ASSISTANCE WITH RESPIRATORY VENTILATION, LESS THAN 24 CONSECUTIVE HOURS, CONTINUOUS POSITIVE AIRWAY PRESSURE: ICD-10-PCS | Performed by: INTERNAL MEDICINE

## 2024-07-30 RX ADMIN — FOLIC ACID SCH MLS/HR: 5 INJECTION, SOLUTION INTRAMUSCULAR; INTRAVENOUS; SUBCUTANEOUS at 12:52

## 2024-07-30 RX ADMIN — IPRATROPIUM BROMIDE AND ALBUTEROL SULFATE SCH ML: .5; 3 SOLUTION RESPIRATORY (INHALATION) at 21:24

## 2024-07-30 RX ADMIN — CEFAZOLIN SODIUM SCH MLS/HR: 1 INJECTION, SOLUTION INTRAVENOUS at 16:09

## 2024-07-30 RX ADMIN — METOLAZONE NR MG: 5 TABLET ORAL at 16:46

## 2024-07-30 RX ADMIN — POTASSIUM CHLORIDE PRN MLS/HR: 200 INJECTION, SOLUTION INTRAVENOUS at 13:16

## 2024-07-30 RX ADMIN — SODIUM CHLORIDE NR MG: 0.9 INJECTION, SOLUTION INTRAVENOUS at 17:44

## 2024-07-30 RX ADMIN — FENTANYL CITRATE PRN MCG: 50 INJECTION, SOLUTION INTRAMUSCULAR; INTRAVENOUS at 15:02

## 2024-07-30 RX ADMIN — Medication SCH STRIP: at 12:00

## 2024-07-30 RX ADMIN — KETOROLAC TROMETHAMINE PRN MG: 15 INJECTION, SOLUTION INTRAMUSCULAR; INTRAVENOUS at 13:34

## 2024-07-30 RX ADMIN — Medication PRN MLS/HR: at 13:23

## 2024-07-30 RX ADMIN — INSULIN HUMAN SCH MLS/HR: 1 INJECTION, SOLUTION INTRAVENOUS at 13:03

## 2024-07-30 RX ADMIN — MAGNESIUM SULFATE IN WATER PRN MLS/HR: 40 INJECTION, SOLUTION INTRAVENOUS at 19:54

## 2024-07-30 RX ADMIN — DOPAMINE HYDROCHLORIDE PRN MLS/HR: 160 INJECTION, SOLUTION INTRAVENOUS at 13:01

## 2024-07-30 RX ADMIN — Medication PRN MG: at 20:07

## 2024-07-30 RX ADMIN — SODIUM CHLORIDE NR MG: 9 INJECTION, SOLUTION INTRAVENOUS at 13:01

## 2024-07-30 RX ADMIN — BACITRACIN ZINC SCH APP: 500 OINTMENT TOPICAL at 21:00

## 2024-07-31 VITALS — RESPIRATION RATE: 17 BRPM | HEART RATE: 99 BPM | SYSTOLIC BLOOD PRESSURE: 153 MMHG | DIASTOLIC BLOOD PRESSURE: 61 MMHG

## 2024-07-31 VITALS — HEART RATE: 107 BPM | SYSTOLIC BLOOD PRESSURE: 136 MMHG | DIASTOLIC BLOOD PRESSURE: 64 MMHG | RESPIRATION RATE: 18 BRPM

## 2024-07-31 VITALS — SYSTOLIC BLOOD PRESSURE: 134 MMHG | DIASTOLIC BLOOD PRESSURE: 63 MMHG | HEART RATE: 98 BPM | RESPIRATION RATE: 21 BRPM

## 2024-07-31 VITALS — RESPIRATION RATE: 24 BRPM | HEART RATE: 101 BPM | SYSTOLIC BLOOD PRESSURE: 152 MMHG | DIASTOLIC BLOOD PRESSURE: 70 MMHG

## 2024-07-31 VITALS — HEART RATE: 103 BPM | SYSTOLIC BLOOD PRESSURE: 136 MMHG | RESPIRATION RATE: 21 BRPM | DIASTOLIC BLOOD PRESSURE: 64 MMHG

## 2024-07-31 VITALS — DIASTOLIC BLOOD PRESSURE: 60 MMHG | SYSTOLIC BLOOD PRESSURE: 133 MMHG | RESPIRATION RATE: 15 BRPM | HEART RATE: 117 BPM

## 2024-07-31 VITALS — HEART RATE: 98 BPM | RESPIRATION RATE: 19 BRPM | DIASTOLIC BLOOD PRESSURE: 70 MMHG | SYSTOLIC BLOOD PRESSURE: 167 MMHG

## 2024-07-31 VITALS — DIASTOLIC BLOOD PRESSURE: 68 MMHG | HEART RATE: 100 BPM | RESPIRATION RATE: 14 BRPM | SYSTOLIC BLOOD PRESSURE: 137 MMHG

## 2024-07-31 VITALS — DIASTOLIC BLOOD PRESSURE: 57 MMHG | RESPIRATION RATE: 16 BRPM | SYSTOLIC BLOOD PRESSURE: 133 MMHG | HEART RATE: 110 BPM

## 2024-07-31 VITALS — DIASTOLIC BLOOD PRESSURE: 66 MMHG | SYSTOLIC BLOOD PRESSURE: 144 MMHG | RESPIRATION RATE: 16 BRPM | HEART RATE: 109 BPM

## 2024-07-31 VITALS — DIASTOLIC BLOOD PRESSURE: 68 MMHG | RESPIRATION RATE: 18 BRPM | HEART RATE: 99 BPM | SYSTOLIC BLOOD PRESSURE: 149 MMHG

## 2024-07-31 VITALS — RESPIRATION RATE: 15 BRPM | SYSTOLIC BLOOD PRESSURE: 137 MMHG | DIASTOLIC BLOOD PRESSURE: 60 MMHG | HEART RATE: 95 BPM

## 2024-07-31 VITALS — SYSTOLIC BLOOD PRESSURE: 128 MMHG | HEART RATE: 117 BPM | DIASTOLIC BLOOD PRESSURE: 62 MMHG | RESPIRATION RATE: 18 BRPM

## 2024-07-31 VITALS — RESPIRATION RATE: 17 BRPM | SYSTOLIC BLOOD PRESSURE: 171 MMHG | HEART RATE: 103 BPM | DIASTOLIC BLOOD PRESSURE: 86 MMHG

## 2024-07-31 VITALS — RESPIRATION RATE: 28 BRPM | DIASTOLIC BLOOD PRESSURE: 63 MMHG | SYSTOLIC BLOOD PRESSURE: 149 MMHG | HEART RATE: 103 BPM

## 2024-07-31 VITALS — HEART RATE: 103 BPM | DIASTOLIC BLOOD PRESSURE: 66 MMHG | SYSTOLIC BLOOD PRESSURE: 147 MMHG | RESPIRATION RATE: 16 BRPM

## 2024-07-31 VITALS — HEART RATE: 109 BPM | SYSTOLIC BLOOD PRESSURE: 142 MMHG | RESPIRATION RATE: 18 BRPM | DIASTOLIC BLOOD PRESSURE: 60 MMHG

## 2024-07-31 VITALS
RESPIRATION RATE: 15 BRPM | SYSTOLIC BLOOD PRESSURE: 149 MMHG | DIASTOLIC BLOOD PRESSURE: 76 MMHG | HEART RATE: 105 BPM | TEMPERATURE: 98.7 F

## 2024-07-31 VITALS — DIASTOLIC BLOOD PRESSURE: 77 MMHG | RESPIRATION RATE: 17 BRPM | HEART RATE: 102 BPM | SYSTOLIC BLOOD PRESSURE: 113 MMHG

## 2024-07-31 VITALS — RESPIRATION RATE: 25 BRPM | HEART RATE: 104 BPM

## 2024-07-31 VITALS — DIASTOLIC BLOOD PRESSURE: 51 MMHG | SYSTOLIC BLOOD PRESSURE: 133 MMHG | HEART RATE: 101 BPM | RESPIRATION RATE: 16 BRPM

## 2024-07-31 VITALS — HEART RATE: 108 BPM | RESPIRATION RATE: 16 BRPM | SYSTOLIC BLOOD PRESSURE: 127 MMHG | DIASTOLIC BLOOD PRESSURE: 66 MMHG

## 2024-07-31 VITALS — SYSTOLIC BLOOD PRESSURE: 152 MMHG | DIASTOLIC BLOOD PRESSURE: 63 MMHG | RESPIRATION RATE: 21 BRPM | HEART RATE: 105 BPM

## 2024-07-31 VITALS — SYSTOLIC BLOOD PRESSURE: 131 MMHG | RESPIRATION RATE: 13 BRPM | DIASTOLIC BLOOD PRESSURE: 76 MMHG | HEART RATE: 106 BPM

## 2024-07-31 VITALS — SYSTOLIC BLOOD PRESSURE: 155 MMHG | HEART RATE: 100 BPM | RESPIRATION RATE: 12 BRPM | DIASTOLIC BLOOD PRESSURE: 71 MMHG

## 2024-07-31 VITALS — RESPIRATION RATE: 16 BRPM | DIASTOLIC BLOOD PRESSURE: 67 MMHG | HEART RATE: 99 BPM | SYSTOLIC BLOOD PRESSURE: 156 MMHG

## 2024-07-31 VITALS — RESPIRATION RATE: 18 BRPM | DIASTOLIC BLOOD PRESSURE: 66 MMHG | HEART RATE: 100 BPM | SYSTOLIC BLOOD PRESSURE: 148 MMHG

## 2024-07-31 VITALS — HEART RATE: 97 BPM | SYSTOLIC BLOOD PRESSURE: 130 MMHG | DIASTOLIC BLOOD PRESSURE: 65 MMHG | RESPIRATION RATE: 12 BRPM

## 2024-07-31 VITALS — DIASTOLIC BLOOD PRESSURE: 65 MMHG | HEART RATE: 104 BPM | SYSTOLIC BLOOD PRESSURE: 125 MMHG | RESPIRATION RATE: 15 BRPM

## 2024-07-31 VITALS — RESPIRATION RATE: 33 BRPM | HEART RATE: 107 BPM | DIASTOLIC BLOOD PRESSURE: 68 MMHG | SYSTOLIC BLOOD PRESSURE: 137 MMHG

## 2024-07-31 VITALS — RESPIRATION RATE: 20 BRPM | SYSTOLIC BLOOD PRESSURE: 151 MMHG | HEART RATE: 104 BPM | DIASTOLIC BLOOD PRESSURE: 63 MMHG

## 2024-07-31 VITALS
HEART RATE: 104 BPM | DIASTOLIC BLOOD PRESSURE: 62 MMHG | RESPIRATION RATE: 18 BRPM | SYSTOLIC BLOOD PRESSURE: 114 MMHG | TEMPERATURE: 99.5 F

## 2024-07-31 VITALS — DIASTOLIC BLOOD PRESSURE: 63 MMHG | RESPIRATION RATE: 18 BRPM | SYSTOLIC BLOOD PRESSURE: 137 MMHG | HEART RATE: 99 BPM

## 2024-07-31 VITALS — DIASTOLIC BLOOD PRESSURE: 69 MMHG | RESPIRATION RATE: 15 BRPM | HEART RATE: 100 BPM | SYSTOLIC BLOOD PRESSURE: 142 MMHG

## 2024-07-31 VITALS — DIASTOLIC BLOOD PRESSURE: 78 MMHG | RESPIRATION RATE: 17 BRPM | HEART RATE: 101 BPM | SYSTOLIC BLOOD PRESSURE: 162 MMHG

## 2024-07-31 VITALS — RESPIRATION RATE: 22 BRPM | DIASTOLIC BLOOD PRESSURE: 58 MMHG | HEART RATE: 108 BPM | SYSTOLIC BLOOD PRESSURE: 138 MMHG

## 2024-07-31 VITALS — RESPIRATION RATE: 27 BRPM | DIASTOLIC BLOOD PRESSURE: 70 MMHG | SYSTOLIC BLOOD PRESSURE: 138 MMHG | HEART RATE: 112 BPM

## 2024-07-31 VITALS — HEART RATE: 112 BPM | DIASTOLIC BLOOD PRESSURE: 61 MMHG | SYSTOLIC BLOOD PRESSURE: 137 MMHG | RESPIRATION RATE: 22 BRPM

## 2024-07-31 VITALS — SYSTOLIC BLOOD PRESSURE: 152 MMHG | DIASTOLIC BLOOD PRESSURE: 79 MMHG | RESPIRATION RATE: 15 BRPM | HEART RATE: 103 BPM

## 2024-07-31 VITALS — HEART RATE: 103 BPM | RESPIRATION RATE: 18 BRPM | SYSTOLIC BLOOD PRESSURE: 166 MMHG | DIASTOLIC BLOOD PRESSURE: 67 MMHG

## 2024-07-31 VITALS — HEART RATE: 105 BPM | DIASTOLIC BLOOD PRESSURE: 70 MMHG | SYSTOLIC BLOOD PRESSURE: 140 MMHG | RESPIRATION RATE: 18 BRPM

## 2024-07-31 VITALS — RESPIRATION RATE: 19 BRPM | DIASTOLIC BLOOD PRESSURE: 59 MMHG | HEART RATE: 101 BPM | SYSTOLIC BLOOD PRESSURE: 133 MMHG

## 2024-07-31 VITALS — DIASTOLIC BLOOD PRESSURE: 61 MMHG | HEART RATE: 117 BPM | RESPIRATION RATE: 18 BRPM | SYSTOLIC BLOOD PRESSURE: 136 MMHG

## 2024-07-31 VITALS — RESPIRATION RATE: 16 BRPM | HEART RATE: 104 BPM | SYSTOLIC BLOOD PRESSURE: 150 MMHG | DIASTOLIC BLOOD PRESSURE: 69 MMHG

## 2024-07-31 VITALS — DIASTOLIC BLOOD PRESSURE: 66 MMHG | SYSTOLIC BLOOD PRESSURE: 134 MMHG | RESPIRATION RATE: 15 BRPM | HEART RATE: 99 BPM

## 2024-07-31 VITALS — DIASTOLIC BLOOD PRESSURE: 71 MMHG | SYSTOLIC BLOOD PRESSURE: 152 MMHG | HEART RATE: 104 BPM | RESPIRATION RATE: 19 BRPM

## 2024-07-31 VITALS — RESPIRATION RATE: 15 BRPM | HEART RATE: 108 BPM | SYSTOLIC BLOOD PRESSURE: 131 MMHG | DIASTOLIC BLOOD PRESSURE: 73 MMHG

## 2024-07-31 VITALS — SYSTOLIC BLOOD PRESSURE: 136 MMHG | RESPIRATION RATE: 16 BRPM | HEART RATE: 97 BPM | DIASTOLIC BLOOD PRESSURE: 65 MMHG

## 2024-07-31 VITALS — RESPIRATION RATE: 22 BRPM | SYSTOLIC BLOOD PRESSURE: 142 MMHG | DIASTOLIC BLOOD PRESSURE: 58 MMHG | HEART RATE: 106 BPM

## 2024-07-31 VITALS — RESPIRATION RATE: 22 BRPM | DIASTOLIC BLOOD PRESSURE: 62 MMHG | SYSTOLIC BLOOD PRESSURE: 139 MMHG | HEART RATE: 104 BPM

## 2024-07-31 VITALS — HEART RATE: 98 BPM | RESPIRATION RATE: 18 BRPM | OXYGEN SATURATION: 100 %

## 2024-07-31 VITALS — DIASTOLIC BLOOD PRESSURE: 67 MMHG | HEART RATE: 97 BPM | RESPIRATION RATE: 21 BRPM | SYSTOLIC BLOOD PRESSURE: 124 MMHG

## 2024-07-31 VITALS — RESPIRATION RATE: 18 BRPM | SYSTOLIC BLOOD PRESSURE: 152 MMHG | HEART RATE: 110 BPM | DIASTOLIC BLOOD PRESSURE: 61 MMHG

## 2024-07-31 VITALS — RESPIRATION RATE: 19 BRPM | SYSTOLIC BLOOD PRESSURE: 134 MMHG | DIASTOLIC BLOOD PRESSURE: 70 MMHG | HEART RATE: 101 BPM

## 2024-07-31 VITALS — HEART RATE: 102 BPM | RESPIRATION RATE: 22 BRPM | SYSTOLIC BLOOD PRESSURE: 121 MMHG | DIASTOLIC BLOOD PRESSURE: 62 MMHG

## 2024-07-31 VITALS — HEART RATE: 112 BPM | SYSTOLIC BLOOD PRESSURE: 143 MMHG | DIASTOLIC BLOOD PRESSURE: 59 MMHG | RESPIRATION RATE: 24 BRPM

## 2024-07-31 VITALS — HEART RATE: 100 BPM | RESPIRATION RATE: 12 BRPM | SYSTOLIC BLOOD PRESSURE: 135 MMHG | DIASTOLIC BLOOD PRESSURE: 73 MMHG

## 2024-07-31 VITALS — SYSTOLIC BLOOD PRESSURE: 152 MMHG | DIASTOLIC BLOOD PRESSURE: 66 MMHG | HEART RATE: 112 BPM | RESPIRATION RATE: 22 BRPM

## 2024-07-31 VITALS — SYSTOLIC BLOOD PRESSURE: 138 MMHG | RESPIRATION RATE: 34 BRPM | HEART RATE: 102 BPM | DIASTOLIC BLOOD PRESSURE: 60 MMHG

## 2024-07-31 VITALS
RESPIRATION RATE: 17 BRPM | DIASTOLIC BLOOD PRESSURE: 67 MMHG | HEART RATE: 97 BPM | TEMPERATURE: 99.1 F | SYSTOLIC BLOOD PRESSURE: 133 MMHG

## 2024-07-31 VITALS — DIASTOLIC BLOOD PRESSURE: 78 MMHG | RESPIRATION RATE: 13 BRPM | SYSTOLIC BLOOD PRESSURE: 155 MMHG | HEART RATE: 106 BPM

## 2024-07-31 VITALS — SYSTOLIC BLOOD PRESSURE: 136 MMHG | HEART RATE: 100 BPM | RESPIRATION RATE: 16 BRPM | DIASTOLIC BLOOD PRESSURE: 57 MMHG

## 2024-07-31 VITALS
SYSTOLIC BLOOD PRESSURE: 119 MMHG | RESPIRATION RATE: 16 BRPM | DIASTOLIC BLOOD PRESSURE: 69 MMHG | TEMPERATURE: 100.5 F | HEART RATE: 118 BPM

## 2024-07-31 VITALS — RESPIRATION RATE: 22 BRPM | HEART RATE: 103 BPM | SYSTOLIC BLOOD PRESSURE: 154 MMHG | DIASTOLIC BLOOD PRESSURE: 64 MMHG

## 2024-07-31 VITALS — DIASTOLIC BLOOD PRESSURE: 66 MMHG | RESPIRATION RATE: 21 BRPM | HEART RATE: 99 BPM | SYSTOLIC BLOOD PRESSURE: 152 MMHG

## 2024-07-31 VITALS — DIASTOLIC BLOOD PRESSURE: 68 MMHG | RESPIRATION RATE: 15 BRPM | HEART RATE: 102 BPM | SYSTOLIC BLOOD PRESSURE: 152 MMHG

## 2024-07-31 VITALS — HEART RATE: 102 BPM | RESPIRATION RATE: 24 BRPM | SYSTOLIC BLOOD PRESSURE: 141 MMHG | DIASTOLIC BLOOD PRESSURE: 64 MMHG

## 2024-07-31 VITALS — RESPIRATION RATE: 18 BRPM | HEART RATE: 102 BPM

## 2024-07-31 VITALS — HEART RATE: 106 BPM | RESPIRATION RATE: 26 BRPM | DIASTOLIC BLOOD PRESSURE: 65 MMHG | SYSTOLIC BLOOD PRESSURE: 137 MMHG

## 2024-07-31 VITALS — RESPIRATION RATE: 21 BRPM | HEART RATE: 102 BPM | OXYGEN SATURATION: 100 %

## 2024-07-31 VITALS — RESPIRATION RATE: 12 BRPM | SYSTOLIC BLOOD PRESSURE: 165 MMHG | DIASTOLIC BLOOD PRESSURE: 85 MMHG | HEART RATE: 109 BPM

## 2024-07-31 VITALS — DIASTOLIC BLOOD PRESSURE: 62 MMHG | RESPIRATION RATE: 18 BRPM | SYSTOLIC BLOOD PRESSURE: 131 MMHG | HEART RATE: 110 BPM

## 2024-07-31 VITALS — RESPIRATION RATE: 14 BRPM | SYSTOLIC BLOOD PRESSURE: 137 MMHG | HEART RATE: 99 BPM | DIASTOLIC BLOOD PRESSURE: 68 MMHG

## 2024-07-31 VITALS — SYSTOLIC BLOOD PRESSURE: 135 MMHG | HEART RATE: 104 BPM | DIASTOLIC BLOOD PRESSURE: 65 MMHG | RESPIRATION RATE: 18 BRPM

## 2024-07-31 VITALS — HEART RATE: 101 BPM | RESPIRATION RATE: 14 BRPM | SYSTOLIC BLOOD PRESSURE: 155 MMHG | DIASTOLIC BLOOD PRESSURE: 71 MMHG

## 2024-07-31 VITALS — HEART RATE: 100 BPM | DIASTOLIC BLOOD PRESSURE: 74 MMHG | SYSTOLIC BLOOD PRESSURE: 147 MMHG | RESPIRATION RATE: 15 BRPM

## 2024-07-31 VITALS — DIASTOLIC BLOOD PRESSURE: 70 MMHG | HEART RATE: 104 BPM | RESPIRATION RATE: 14 BRPM | SYSTOLIC BLOOD PRESSURE: 145 MMHG

## 2024-07-31 VITALS — DIASTOLIC BLOOD PRESSURE: 75 MMHG | SYSTOLIC BLOOD PRESSURE: 137 MMHG | RESPIRATION RATE: 19 BRPM | HEART RATE: 100 BPM

## 2024-07-31 VITALS — RESPIRATION RATE: 29 BRPM | SYSTOLIC BLOOD PRESSURE: 146 MMHG | DIASTOLIC BLOOD PRESSURE: 76 MMHG | HEART RATE: 101 BPM

## 2024-07-31 VITALS — DIASTOLIC BLOOD PRESSURE: 65 MMHG | RESPIRATION RATE: 17 BRPM | SYSTOLIC BLOOD PRESSURE: 143 MMHG | HEART RATE: 106 BPM

## 2024-07-31 VITALS — HEART RATE: 99 BPM | RESPIRATION RATE: 20 BRPM | OXYGEN SATURATION: 100 %

## 2024-07-31 VITALS — SYSTOLIC BLOOD PRESSURE: 146 MMHG | HEART RATE: 103 BPM | DIASTOLIC BLOOD PRESSURE: 69 MMHG | RESPIRATION RATE: 12 BRPM

## 2024-07-31 VITALS — HEART RATE: 104 BPM | DIASTOLIC BLOOD PRESSURE: 62 MMHG | SYSTOLIC BLOOD PRESSURE: 127 MMHG | RESPIRATION RATE: 16 BRPM

## 2024-07-31 VITALS — RESPIRATION RATE: 14 BRPM | SYSTOLIC BLOOD PRESSURE: 130 MMHG | DIASTOLIC BLOOD PRESSURE: 69 MMHG | HEART RATE: 104 BPM

## 2024-07-31 VITALS — RESPIRATION RATE: 17 BRPM | SYSTOLIC BLOOD PRESSURE: 120 MMHG | DIASTOLIC BLOOD PRESSURE: 58 MMHG | HEART RATE: 101 BPM

## 2024-07-31 VITALS — HEART RATE: 99 BPM | RESPIRATION RATE: 16 BRPM | DIASTOLIC BLOOD PRESSURE: 68 MMHG | SYSTOLIC BLOOD PRESSURE: 131 MMHG

## 2024-07-31 VITALS
RESPIRATION RATE: 15 BRPM | TEMPERATURE: 98.7 F | SYSTOLIC BLOOD PRESSURE: 104 MMHG | HEART RATE: 101 BPM | DIASTOLIC BLOOD PRESSURE: 52 MMHG

## 2024-07-31 VITALS — DIASTOLIC BLOOD PRESSURE: 52 MMHG | SYSTOLIC BLOOD PRESSURE: 117 MMHG | RESPIRATION RATE: 21 BRPM | HEART RATE: 98 BPM

## 2024-07-31 VITALS — SYSTOLIC BLOOD PRESSURE: 149 MMHG | DIASTOLIC BLOOD PRESSURE: 75 MMHG | RESPIRATION RATE: 17 BRPM | HEART RATE: 107 BPM

## 2024-07-31 VITALS — HEART RATE: 100 BPM | RESPIRATION RATE: 20 BRPM | OXYGEN SATURATION: 100 %

## 2024-07-31 VITALS — DIASTOLIC BLOOD PRESSURE: 67 MMHG | SYSTOLIC BLOOD PRESSURE: 140 MMHG | RESPIRATION RATE: 15 BRPM | HEART RATE: 101 BPM

## 2024-07-31 VITALS — SYSTOLIC BLOOD PRESSURE: 160 MMHG | HEART RATE: 99 BPM | RESPIRATION RATE: 14 BRPM | DIASTOLIC BLOOD PRESSURE: 80 MMHG

## 2024-07-31 VITALS — RESPIRATION RATE: 13 BRPM | HEART RATE: 102 BPM | DIASTOLIC BLOOD PRESSURE: 74 MMHG | SYSTOLIC BLOOD PRESSURE: 143 MMHG

## 2024-07-31 VITALS — DIASTOLIC BLOOD PRESSURE: 64 MMHG | HEART RATE: 101 BPM | RESPIRATION RATE: 19 BRPM | SYSTOLIC BLOOD PRESSURE: 144 MMHG

## 2024-07-31 VITALS — HEART RATE: 101 BPM | RESPIRATION RATE: 37 BRPM

## 2024-07-31 VITALS — HEART RATE: 110 BPM | SYSTOLIC BLOOD PRESSURE: 147 MMHG | RESPIRATION RATE: 16 BRPM | DIASTOLIC BLOOD PRESSURE: 64 MMHG

## 2024-07-31 VITALS — RESPIRATION RATE: 20 BRPM | OXYGEN SATURATION: 100 % | HEART RATE: 101 BPM

## 2024-07-31 LAB
ANISOCYTOSIS BLD QL: (no result)
BASE EXCESS BLDA CALC-SCNC: -3.8 MMOL/L (ref -2–2)
BASE EXCESS BLDA CALC-SCNC: -4.2 MMOL/L (ref -2–2)
BASOPHILS NFR BLD AUTO: 0.2 % (ref 0–2)
BUN SERPL-MCNC: 24 MG/DL (ref 9–23)
BUN SERPL-MCNC: 25 MG/DL (ref 9–23)
CALCIUM SERPL-MCNC: 8.7 MG/DL (ref 8.7–10.4)
CALCIUM SERPL-MCNC: 8.9 MG/DL (ref 8.7–10.4)
CHLORIDE SERPL-SCNC: 104 MEQ/L (ref 98–107)
CHLORIDE SERPL-SCNC: 107 MEQ/L (ref 98–107)
CO2 SERPL-SCNC: 21 MEQ/L (ref 21–32)
CO2 SERPL-SCNC: 22 MEQ/L (ref 21–32)
COHGB MFR BLDA: 1.2 % (ref 0.5–1.5)
COHGB MFR BLDA: 1.2 % (ref 0.5–1.5)
CREAT SERPL-MCNC: 1.3 MG/DL (ref 0.6–1)
CREAT SERPL-MCNC: 1.4 MG/DL (ref 0.6–1)
DO-HGB MFR BLDA: 0.9 % (ref 0–5)
DO-HGB MFR BLDA: 4.1 % (ref 0–5)
EOSINOPHIL NFR BLD AUTO: 0 % (ref 0–5)
ERYTHROCYTE [DISTWIDTH] IN BLOOD BY AUTOMATED COUNT: 22.8 % (ref 11.6–14.6)
ERYTHROCYTE [DISTWIDTH] IN BLOOD BY AUTOMATED COUNT: 23.1 % (ref 11.6–14.6)
GLUCOSE SERPL-MCNC: 122 MG/DL (ref 70–105)
GLUCOSE SERPL-MCNC: 97 MG/DL (ref 70–105)
HCO3 BLDA-SCNC: 20.1 MMOL/L (ref 22–26)
HCO3 BLDA-SCNC: 20.6 MMOL/L (ref 22–26)
HCT VFR BLD AUTO: 31.9 % (ref 36–48)
HCT VFR BLD AUTO: 32.6 % (ref 36–48)
HGB BLD-MCNC: 9.6 G/DL (ref 12–16)
HGB BLD-MCNC: 9.6 G/DL (ref 12–16)
HGB BLDA OXIMETRY-MCNC: 10.9 G/DL (ref 12–18)
HGB BLDA OXIMETRY-MCNC: 11.2 G/DL (ref 12–18)
INHALED O2 CONCENTRATION: 100 %
INHALED O2 CONCENTRATION: 44 %
INHALED O2 FLOW RATE: 15 L/MIN
INHALED O2 FLOW RATE: 6 L/MIN
LYMPHOCYTES NFR BLD AUTO: 1.8 % (ref 20–50)
LYMPHOCYTES NFR BLD MANUAL: 3 % (ref 20–60)
MANUAL DIF COMMENT BLD-IMP: 1
MANUAL DIF COMMENT BLD-IMP: 1
MCH RBC QN AUTO: 22.4 PG (ref 28–32)
MCH RBC QN AUTO: 22.5 PG (ref 28–32)
MCHC RBC AUTO-ENTMCNC: 29.5 G/DL (ref 31–37)
MCHC RBC AUTO-ENTMCNC: 30 G/DL (ref 31–37)
MCV RBC AUTO: 75.2 FL (ref 81–99)
MCV RBC AUTO: 75.7 FL (ref 81–99)
METHGB MFR BLDA: 0.3 % (ref 0–1.5)
METHGB MFR BLDA: 0.3 % (ref 0–1.5)
MICROCYTES BLD QL SMEAR: (no result)
MONOCYTES NFR BLD AUTO: 9.4 % (ref 2–8)
MONOCYTES NFR BLD MANUAL: 4 % (ref 2–8)
NEUTROPHILS NFR BLD AUTO: 88.6 % (ref 40–76)
NEUTS BAND NFR BLD MANUAL: 2 % (ref 1–6)
NEUTS SEG NFR BLD MANUAL: 91 % (ref 45–75)
OXYHGB MFR BLDA: 94.4 % (ref 94–97)
OXYHGB MFR BLDA: 97.6 % (ref 94–97)
PCO2 TEMP ADJ BLDA: 32.6 MMHG (ref 35–45)
PCO2 TEMP ADJ BLDA: 36.4 MMHG (ref 35–45)
PH TEMP ADJ BLDA: 7.37 [PH] (ref 7.35–7.45)
PH TEMP ADJ BLDA: 7.41 [PH] (ref 7.35–7.45)
PHOSPHATE SERPL-MCNC: 3 MG/DL (ref 2.5–4.9)
PLATELET # BLD AUTO: 269 X1000/UL (ref 130–400)
PLATELET # BLD AUTO: 288 X1000/UL (ref 130–400)
PLATELET # BLD EST: NORMAL 10*3/UL
PMV BLD AUTO: 7.3 FL (ref 7.4–10.4)
PMV BLD AUTO: 7.8 FL (ref 7.4–10.4)
PO2 TEMP ADJ BLDA: 135.9 MMHG (ref 75–100)
PO2 TEMP ADJ BLDA: 75.8 MMHG (ref 75–100)
POTASSIUM SERPL-SCNC: 4.1 MEQ/L (ref 3.5–5.1)
POTASSIUM SERPL-SCNC: 4.1 MEQ/L (ref 3.5–5.1)
RBC # BLD AUTO: 4.25 MILL/UL (ref 4.2–5.4)
RBC # BLD AUTO: 4.31 MILL/UL (ref 4.2–5.4)
SAO2 % BLDA: 95.8 % (ref 92–98.5)
SAO2 % BLDA: 99.1 % (ref 92–98.5)
SODIUM SERPL-SCNC: 135 MEQ/L (ref 136–145)
SODIUM SERPL-SCNC: 135 MEQ/L (ref 136–145)
SPECIMEN DRAWN FROM PATIENT: (no result)
SPECIMEN DRAWN FROM PATIENT: (no result)
VENTILATION MODE VENT: (no result)
VENTILATION MODE VENT: (no result)
WBC # BLD: 11.7 X1000/UL (ref 4.5–11)
WBC # BLD: 12.7 X1000/UL (ref 4.5–11)

## 2024-07-31 RX ADMIN — FUROSEMIDE NR MG: 10 INJECTION, SOLUTION INTRAMUSCULAR; INTRAVENOUS at 08:35

## 2024-07-31 RX ADMIN — CEFAZOLIN SODIUM SCH MLS/HR: 1 INJECTION, SOLUTION INTRAVENOUS at 06:50

## 2024-07-31 RX ADMIN — HYDROCODONE BITARTRATE AND ACETAMINOPHEN PRN TAB: 10; 325 TABLET ORAL at 12:57

## 2024-07-31 RX ADMIN — SERTRALINE HYDROCHLORIDE SCH MG: 50 TABLET, FILM COATED ORAL at 14:35

## 2024-07-31 RX ADMIN — Medication SCH STRIP: at 17:50

## 2024-07-31 RX ADMIN — Medication SCH STRIP: at 05:28

## 2024-07-31 RX ADMIN — HYDROCODONE BITARTRATE AND ACETAMINOPHEN PRN TAB: 10; 325 TABLET ORAL at 02:14

## 2024-07-31 RX ADMIN — METHYLPREDNISOLONE SODIUM SUCCINATE NR MG: 125 INJECTION, POWDER, FOR SOLUTION INTRAMUSCULAR; INTRAVENOUS at 11:06

## 2024-07-31 RX ADMIN — Medication SCH MG: at 21:08

## 2024-07-31 RX ADMIN — FAMOTIDINE SCH MG: 10 INJECTION INTRAVENOUS at 08:35

## 2024-07-31 RX ADMIN — METOLAZONE NR MG: 5 TABLET ORAL at 08:35

## 2024-07-31 RX ADMIN — ALPRAZOLAM PRN MG: 0.25 TABLET ORAL at 03:14

## 2024-07-31 RX ADMIN — APIXABAN SCH MG: 2.5 TABLET, FILM COATED ORAL at 21:08

## 2024-07-31 RX ADMIN — INSULIN LISPRO SCH UNIT: 100 INJECTION, SOLUTION INTRAVENOUS; SUBCUTANEOUS at 18:12

## 2024-07-31 RX ADMIN — ACETAMINOPHEN PRN MG: 325 TABLET, FILM COATED ORAL at 03:13

## 2024-07-31 RX ADMIN — SODIUM CHLORIDE NR MG: 0.9 INJECTION, SOLUTION INTRAVENOUS at 10:35

## 2024-08-01 VITALS — DIASTOLIC BLOOD PRESSURE: 66 MMHG | HEART RATE: 100 BPM | SYSTOLIC BLOOD PRESSURE: 152 MMHG | RESPIRATION RATE: 12 BRPM

## 2024-08-01 VITALS — DIASTOLIC BLOOD PRESSURE: 72 MMHG | SYSTOLIC BLOOD PRESSURE: 168 MMHG | RESPIRATION RATE: 12 BRPM | HEART RATE: 101 BPM

## 2024-08-01 VITALS — OXYGEN SATURATION: 98 % | HEART RATE: 94 BPM | RESPIRATION RATE: 15 BRPM

## 2024-08-01 VITALS — SYSTOLIC BLOOD PRESSURE: 142 MMHG | HEART RATE: 98 BPM | DIASTOLIC BLOOD PRESSURE: 68 MMHG | RESPIRATION RATE: 17 BRPM

## 2024-08-01 VITALS — HEART RATE: 100 BPM | RESPIRATION RATE: 16 BRPM

## 2024-08-01 VITALS — SYSTOLIC BLOOD PRESSURE: 153 MMHG | RESPIRATION RATE: 16 BRPM | DIASTOLIC BLOOD PRESSURE: 90 MMHG | HEART RATE: 99 BPM

## 2024-08-01 VITALS — DIASTOLIC BLOOD PRESSURE: 78 MMHG | RESPIRATION RATE: 16 BRPM | SYSTOLIC BLOOD PRESSURE: 160 MMHG | HEART RATE: 102 BPM

## 2024-08-01 VITALS
TEMPERATURE: 98 F | RESPIRATION RATE: 16 BRPM | SYSTOLIC BLOOD PRESSURE: 151 MMHG | HEART RATE: 99 BPM | DIASTOLIC BLOOD PRESSURE: 71 MMHG

## 2024-08-01 VITALS — HEART RATE: 98 BPM | RESPIRATION RATE: 13 BRPM

## 2024-08-01 VITALS — HEART RATE: 99 BPM | RESPIRATION RATE: 13 BRPM

## 2024-08-01 VITALS — SYSTOLIC BLOOD PRESSURE: 161 MMHG | RESPIRATION RATE: 18 BRPM | HEART RATE: 105 BPM | DIASTOLIC BLOOD PRESSURE: 97 MMHG

## 2024-08-01 VITALS — HEART RATE: 101 BPM | SYSTOLIC BLOOD PRESSURE: 144 MMHG | RESPIRATION RATE: 14 BRPM | DIASTOLIC BLOOD PRESSURE: 69 MMHG

## 2024-08-01 VITALS — RESPIRATION RATE: 12 BRPM | HEART RATE: 100 BPM

## 2024-08-01 VITALS — HEART RATE: 94 BPM | SYSTOLIC BLOOD PRESSURE: 136 MMHG | RESPIRATION RATE: 17 BRPM | DIASTOLIC BLOOD PRESSURE: 70 MMHG

## 2024-08-01 VITALS — SYSTOLIC BLOOD PRESSURE: 138 MMHG | HEART RATE: 101 BPM | RESPIRATION RATE: 17 BRPM | DIASTOLIC BLOOD PRESSURE: 83 MMHG

## 2024-08-01 VITALS — DIASTOLIC BLOOD PRESSURE: 69 MMHG | SYSTOLIC BLOOD PRESSURE: 140 MMHG | RESPIRATION RATE: 11 BRPM | HEART RATE: 100 BPM

## 2024-08-01 VITALS — SYSTOLIC BLOOD PRESSURE: 143 MMHG | RESPIRATION RATE: 26 BRPM | HEART RATE: 106 BPM | DIASTOLIC BLOOD PRESSURE: 88 MMHG

## 2024-08-01 VITALS — SYSTOLIC BLOOD PRESSURE: 154 MMHG | HEART RATE: 93 BPM | RESPIRATION RATE: 10 BRPM | DIASTOLIC BLOOD PRESSURE: 89 MMHG

## 2024-08-01 VITALS — SYSTOLIC BLOOD PRESSURE: 142 MMHG | RESPIRATION RATE: 15 BRPM | DIASTOLIC BLOOD PRESSURE: 59 MMHG | HEART RATE: 95 BPM

## 2024-08-01 VITALS — HEART RATE: 89 BPM | RESPIRATION RATE: 16 BRPM | DIASTOLIC BLOOD PRESSURE: 72 MMHG | SYSTOLIC BLOOD PRESSURE: 154 MMHG

## 2024-08-01 VITALS — DIASTOLIC BLOOD PRESSURE: 63 MMHG | SYSTOLIC BLOOD PRESSURE: 153 MMHG | HEART RATE: 100 BPM | RESPIRATION RATE: 11 BRPM

## 2024-08-01 VITALS — RESPIRATION RATE: 23 BRPM | DIASTOLIC BLOOD PRESSURE: 73 MMHG | SYSTOLIC BLOOD PRESSURE: 159 MMHG | HEART RATE: 101 BPM

## 2024-08-01 VITALS — HEART RATE: 93 BPM | SYSTOLIC BLOOD PRESSURE: 142 MMHG | RESPIRATION RATE: 10 BRPM | DIASTOLIC BLOOD PRESSURE: 70 MMHG

## 2024-08-01 VITALS — HEART RATE: 101 BPM | RESPIRATION RATE: 12 BRPM

## 2024-08-01 VITALS
SYSTOLIC BLOOD PRESSURE: 154 MMHG | DIASTOLIC BLOOD PRESSURE: 78 MMHG | TEMPERATURE: 98.7 F | HEART RATE: 91 BPM | RESPIRATION RATE: 11 BRPM

## 2024-08-01 VITALS — HEART RATE: 101 BPM | RESPIRATION RATE: 11 BRPM

## 2024-08-01 VITALS — RESPIRATION RATE: 11 BRPM | HEART RATE: 95 BPM | DIASTOLIC BLOOD PRESSURE: 74 MMHG | SYSTOLIC BLOOD PRESSURE: 162 MMHG

## 2024-08-01 VITALS — SYSTOLIC BLOOD PRESSURE: 157 MMHG | HEART RATE: 101 BPM | RESPIRATION RATE: 12 BRPM | DIASTOLIC BLOOD PRESSURE: 59 MMHG

## 2024-08-01 VITALS — RESPIRATION RATE: 19 BRPM | HEART RATE: 106 BPM

## 2024-08-01 VITALS — HEART RATE: 102 BPM | SYSTOLIC BLOOD PRESSURE: 148 MMHG | DIASTOLIC BLOOD PRESSURE: 72 MMHG | RESPIRATION RATE: 12 BRPM

## 2024-08-01 VITALS
SYSTOLIC BLOOD PRESSURE: 146 MMHG | HEART RATE: 101 BPM | DIASTOLIC BLOOD PRESSURE: 67 MMHG | TEMPERATURE: 98.2 F | RESPIRATION RATE: 12 BRPM

## 2024-08-01 VITALS — HEART RATE: 97 BPM | DIASTOLIC BLOOD PRESSURE: 79 MMHG | SYSTOLIC BLOOD PRESSURE: 155 MMHG | RESPIRATION RATE: 15 BRPM

## 2024-08-01 VITALS — SYSTOLIC BLOOD PRESSURE: 150 MMHG | HEART RATE: 93 BPM | RESPIRATION RATE: 11 BRPM | DIASTOLIC BLOOD PRESSURE: 83 MMHG

## 2024-08-01 VITALS — DIASTOLIC BLOOD PRESSURE: 78 MMHG | HEART RATE: 100 BPM | SYSTOLIC BLOOD PRESSURE: 171 MMHG | RESPIRATION RATE: 16 BRPM

## 2024-08-01 VITALS — RESPIRATION RATE: 17 BRPM | DIASTOLIC BLOOD PRESSURE: 63 MMHG | SYSTOLIC BLOOD PRESSURE: 166 MMHG | HEART RATE: 103 BPM

## 2024-08-01 VITALS
TEMPERATURE: 98.1 F | HEART RATE: 94 BPM | RESPIRATION RATE: 11 BRPM | DIASTOLIC BLOOD PRESSURE: 77 MMHG | SYSTOLIC BLOOD PRESSURE: 161 MMHG

## 2024-08-01 VITALS — RESPIRATION RATE: 9 BRPM | DIASTOLIC BLOOD PRESSURE: 85 MMHG | SYSTOLIC BLOOD PRESSURE: 161 MMHG | HEART RATE: 93 BPM

## 2024-08-01 VITALS — RESPIRATION RATE: 17 BRPM | DIASTOLIC BLOOD PRESSURE: 91 MMHG | HEART RATE: 90 BPM | SYSTOLIC BLOOD PRESSURE: 150 MMHG

## 2024-08-01 VITALS — SYSTOLIC BLOOD PRESSURE: 139 MMHG | RESPIRATION RATE: 16 BRPM | HEART RATE: 93 BPM | DIASTOLIC BLOOD PRESSURE: 73 MMHG

## 2024-08-01 VITALS — RESPIRATION RATE: 17 BRPM | HEART RATE: 99 BPM

## 2024-08-01 VITALS — HEART RATE: 99 BPM | SYSTOLIC BLOOD PRESSURE: 154 MMHG | DIASTOLIC BLOOD PRESSURE: 67 MMHG | RESPIRATION RATE: 21 BRPM

## 2024-08-01 VITALS — SYSTOLIC BLOOD PRESSURE: 147 MMHG | HEART RATE: 103 BPM | DIASTOLIC BLOOD PRESSURE: 73 MMHG | RESPIRATION RATE: 15 BRPM

## 2024-08-01 VITALS — SYSTOLIC BLOOD PRESSURE: 131 MMHG | DIASTOLIC BLOOD PRESSURE: 64 MMHG | HEART RATE: 102 BPM | RESPIRATION RATE: 19 BRPM

## 2024-08-01 VITALS — SYSTOLIC BLOOD PRESSURE: 155 MMHG | DIASTOLIC BLOOD PRESSURE: 69 MMHG | RESPIRATION RATE: 10 BRPM | HEART RATE: 93 BPM

## 2024-08-01 VITALS — DIASTOLIC BLOOD PRESSURE: 84 MMHG | SYSTOLIC BLOOD PRESSURE: 168 MMHG | RESPIRATION RATE: 15 BRPM | HEART RATE: 104 BPM

## 2024-08-01 VITALS — HEART RATE: 101 BPM | RESPIRATION RATE: 16 BRPM | SYSTOLIC BLOOD PRESSURE: 162 MMHG | DIASTOLIC BLOOD PRESSURE: 74 MMHG

## 2024-08-01 VITALS — DIASTOLIC BLOOD PRESSURE: 72 MMHG | SYSTOLIC BLOOD PRESSURE: 160 MMHG | HEART RATE: 101 BPM | RESPIRATION RATE: 12 BRPM

## 2024-08-01 VITALS — HEART RATE: 94 BPM | SYSTOLIC BLOOD PRESSURE: 165 MMHG | RESPIRATION RATE: 10 BRPM | DIASTOLIC BLOOD PRESSURE: 75 MMHG

## 2024-08-01 VITALS — HEART RATE: 100 BPM | RESPIRATION RATE: 12 BRPM

## 2024-08-01 VITALS — RESPIRATION RATE: 17 BRPM | HEART RATE: 99 BPM | DIASTOLIC BLOOD PRESSURE: 68 MMHG | SYSTOLIC BLOOD PRESSURE: 146 MMHG

## 2024-08-01 VITALS — HEART RATE: 103 BPM | DIASTOLIC BLOOD PRESSURE: 69 MMHG | SYSTOLIC BLOOD PRESSURE: 166 MMHG | RESPIRATION RATE: 19 BRPM

## 2024-08-01 VITALS
HEART RATE: 100 BPM | TEMPERATURE: 98 F | SYSTOLIC BLOOD PRESSURE: 159 MMHG | RESPIRATION RATE: 12 BRPM | DIASTOLIC BLOOD PRESSURE: 67 MMHG

## 2024-08-01 VITALS — HEART RATE: 94 BPM | DIASTOLIC BLOOD PRESSURE: 75 MMHG | SYSTOLIC BLOOD PRESSURE: 159 MMHG | RESPIRATION RATE: 22 BRPM

## 2024-08-01 VITALS — HEART RATE: 91 BPM | DIASTOLIC BLOOD PRESSURE: 80 MMHG | RESPIRATION RATE: 13 BRPM | SYSTOLIC BLOOD PRESSURE: 153 MMHG

## 2024-08-01 VITALS — SYSTOLIC BLOOD PRESSURE: 136 MMHG | HEART RATE: 96 BPM | RESPIRATION RATE: 10 BRPM | DIASTOLIC BLOOD PRESSURE: 70 MMHG

## 2024-08-01 VITALS — SYSTOLIC BLOOD PRESSURE: 164 MMHG | DIASTOLIC BLOOD PRESSURE: 73 MMHG | RESPIRATION RATE: 24 BRPM | HEART RATE: 102 BPM

## 2024-08-01 VITALS
RESPIRATION RATE: 13 BRPM | TEMPERATURE: 98.3 F | SYSTOLIC BLOOD PRESSURE: 165 MMHG | HEART RATE: 103 BPM | DIASTOLIC BLOOD PRESSURE: 67 MMHG

## 2024-08-01 VITALS — HEART RATE: 103 BPM | RESPIRATION RATE: 22 BRPM

## 2024-08-01 VITALS — RESPIRATION RATE: 21 BRPM | SYSTOLIC BLOOD PRESSURE: 154 MMHG | DIASTOLIC BLOOD PRESSURE: 73 MMHG | HEART RATE: 103 BPM

## 2024-08-01 VITALS — RESPIRATION RATE: 18 BRPM | HEART RATE: 100 BPM

## 2024-08-01 VITALS — RESPIRATION RATE: 15 BRPM | HEART RATE: 101 BPM

## 2024-08-01 VITALS — RESPIRATION RATE: 13 BRPM | HEART RATE: 103 BPM

## 2024-08-01 VITALS — RESPIRATION RATE: 11 BRPM | HEART RATE: 100 BPM

## 2024-08-01 VITALS — OXYGEN SATURATION: 96 % | RESPIRATION RATE: 11 BRPM | HEART RATE: 98 BPM

## 2024-08-01 VITALS — HEART RATE: 101 BPM | RESPIRATION RATE: 14 BRPM

## 2024-08-01 LAB
BASOPHILS NFR BLD AUTO: 0.2 % (ref 0–2)
BUN SERPL-MCNC: 35 MG/DL (ref 9–23)
CALCIUM SERPL-MCNC: 9.4 MG/DL (ref 8.7–10.4)
CHLORIDE SERPL-SCNC: 104 MEQ/L (ref 98–107)
CO2 SERPL-SCNC: 20 MEQ/L (ref 21–32)
CREAT SERPL-MCNC: 1.5 MG/DL (ref 0.6–1)
EOSINOPHIL NFR BLD AUTO: 0 % (ref 0–5)
ERYTHROCYTE [DISTWIDTH] IN BLOOD BY AUTOMATED COUNT: 23.2 % (ref 11.6–14.6)
GLUCOSE SERPL-MCNC: 159 MG/DL (ref 70–105)
HCT VFR BLD AUTO: 32.2 % (ref 36–48)
HGB BLD-MCNC: 9.6 G/DL (ref 12–16)
LYMPHOCYTES NFR BLD AUTO: 1.5 % (ref 20–50)
MANUAL DIF COMMENT BLD-IMP: 1
MCH RBC QN AUTO: 22.4 PG (ref 28–32)
MCHC RBC AUTO-ENTMCNC: 29.8 G/DL (ref 31–37)
MCV RBC AUTO: 75.2 FL (ref 81–99)
MONOCYTES NFR BLD AUTO: 6.4 % (ref 2–8)
NEUTROPHILS NFR BLD AUTO: 91.9 % (ref 40–76)
PLATELET # BLD AUTO: 286 X1000/UL (ref 130–400)
PMV BLD AUTO: 8.6 FL (ref 7.4–10.4)
POTASSIUM SERPL-SCNC: 3.9 MEQ/L (ref 3.5–5.1)
POTASSIUM SERPL-SCNC: 4.6 MEQ/L (ref 3.5–5.1)
RBC # BLD AUTO: 4.28 MILL/UL (ref 4.2–5.4)
SODIUM SERPL-SCNC: 132 MEQ/L (ref 136–145)
WBC # BLD: 12.1 X1000/UL (ref 4.5–11)

## 2024-08-01 RX ADMIN — SODIUM CHLORIDE NR MG: 0.9 INJECTION, SOLUTION INTRAVENOUS at 10:26

## 2024-08-01 RX ADMIN — APIXABAN SCH MG: 5 TABLET, FILM COATED ORAL at 21:06

## 2024-08-01 RX ADMIN — Medication SCH ML: at 09:00

## 2024-08-01 RX ADMIN — Medication SCH MG: at 13:21

## 2024-08-01 RX ADMIN — METOLAZONE NR MG: 10 TABLET ORAL at 10:11

## 2024-08-01 RX ADMIN — FOLIC ACID SCH MG: 1 TABLET ORAL at 13:22

## 2024-08-01 RX ADMIN — DILTIAZEM HYDROCHLORIDE SCH MG: 30 TABLET, FILM COATED ORAL at 13:22

## 2024-08-02 VITALS — HEART RATE: 93 BPM | DIASTOLIC BLOOD PRESSURE: 72 MMHG | SYSTOLIC BLOOD PRESSURE: 159 MMHG | RESPIRATION RATE: 13 BRPM

## 2024-08-02 VITALS
HEART RATE: 93 BPM | RESPIRATION RATE: 11 BRPM | SYSTOLIC BLOOD PRESSURE: 162 MMHG | DIASTOLIC BLOOD PRESSURE: 87 MMHG | TEMPERATURE: 97.6 F

## 2024-08-02 VITALS — DIASTOLIC BLOOD PRESSURE: 76 MMHG | HEART RATE: 91 BPM | RESPIRATION RATE: 15 BRPM | SYSTOLIC BLOOD PRESSURE: 153 MMHG

## 2024-08-02 VITALS — RESPIRATION RATE: 13 BRPM | DIASTOLIC BLOOD PRESSURE: 94 MMHG | SYSTOLIC BLOOD PRESSURE: 185 MMHG | HEART RATE: 95 BPM

## 2024-08-02 VITALS — RESPIRATION RATE: 14 BRPM | HEART RATE: 91 BPM | DIASTOLIC BLOOD PRESSURE: 71 MMHG | SYSTOLIC BLOOD PRESSURE: 149 MMHG

## 2024-08-02 VITALS — HEART RATE: 99 BPM | SYSTOLIC BLOOD PRESSURE: 156 MMHG | RESPIRATION RATE: 19 BRPM | DIASTOLIC BLOOD PRESSURE: 83 MMHG

## 2024-08-02 VITALS — RESPIRATION RATE: 15 BRPM | HEART RATE: 97 BPM | DIASTOLIC BLOOD PRESSURE: 141 MMHG | SYSTOLIC BLOOD PRESSURE: 163 MMHG

## 2024-08-02 VITALS — DIASTOLIC BLOOD PRESSURE: 72 MMHG | HEART RATE: 91 BPM | SYSTOLIC BLOOD PRESSURE: 142 MMHG | RESPIRATION RATE: 11 BRPM

## 2024-08-02 VITALS — RESPIRATION RATE: 10 BRPM | HEART RATE: 90 BPM | SYSTOLIC BLOOD PRESSURE: 149 MMHG | DIASTOLIC BLOOD PRESSURE: 71 MMHG

## 2024-08-02 VITALS — SYSTOLIC BLOOD PRESSURE: 155 MMHG | DIASTOLIC BLOOD PRESSURE: 84 MMHG | HEART RATE: 95 BPM | RESPIRATION RATE: 14 BRPM

## 2024-08-02 VITALS — RESPIRATION RATE: 12 BRPM | SYSTOLIC BLOOD PRESSURE: 168 MMHG | DIASTOLIC BLOOD PRESSURE: 80 MMHG | HEART RATE: 95 BPM

## 2024-08-02 VITALS — DIASTOLIC BLOOD PRESSURE: 83 MMHG | HEART RATE: 93 BPM | RESPIRATION RATE: 12 BRPM | SYSTOLIC BLOOD PRESSURE: 176 MMHG

## 2024-08-02 VITALS — DIASTOLIC BLOOD PRESSURE: 109 MMHG | HEART RATE: 98 BPM | SYSTOLIC BLOOD PRESSURE: 147 MMHG | RESPIRATION RATE: 17 BRPM

## 2024-08-02 VITALS — OXYGEN SATURATION: 98 % | RESPIRATION RATE: 16 BRPM | HEART RATE: 91 BPM

## 2024-08-02 VITALS — DIASTOLIC BLOOD PRESSURE: 73 MMHG | SYSTOLIC BLOOD PRESSURE: 135 MMHG | RESPIRATION RATE: 13 BRPM | HEART RATE: 97 BPM

## 2024-08-02 VITALS — SYSTOLIC BLOOD PRESSURE: 148 MMHG | HEART RATE: 90 BPM | RESPIRATION RATE: 11 BRPM | DIASTOLIC BLOOD PRESSURE: 76 MMHG

## 2024-08-02 VITALS
TEMPERATURE: 98.7 F | HEART RATE: 93 BPM | SYSTOLIC BLOOD PRESSURE: 150 MMHG | RESPIRATION RATE: 10 BRPM | DIASTOLIC BLOOD PRESSURE: 75 MMHG

## 2024-08-02 VITALS — HEART RATE: 92 BPM | DIASTOLIC BLOOD PRESSURE: 85 MMHG | RESPIRATION RATE: 12 BRPM | SYSTOLIC BLOOD PRESSURE: 154 MMHG

## 2024-08-02 VITALS — DIASTOLIC BLOOD PRESSURE: 91 MMHG | RESPIRATION RATE: 12 BRPM | SYSTOLIC BLOOD PRESSURE: 161 MMHG | HEART RATE: 92 BPM

## 2024-08-02 VITALS — RESPIRATION RATE: 11 BRPM | HEART RATE: 94 BPM | DIASTOLIC BLOOD PRESSURE: 78 MMHG | SYSTOLIC BLOOD PRESSURE: 168 MMHG

## 2024-08-02 VITALS — HEART RATE: 91 BPM | SYSTOLIC BLOOD PRESSURE: 144 MMHG | RESPIRATION RATE: 9 BRPM | DIASTOLIC BLOOD PRESSURE: 67 MMHG

## 2024-08-02 VITALS — HEART RATE: 95 BPM | DIASTOLIC BLOOD PRESSURE: 73 MMHG | SYSTOLIC BLOOD PRESSURE: 144 MMHG | RESPIRATION RATE: 10 BRPM

## 2024-08-02 VITALS — RESPIRATION RATE: 12 BRPM | HEART RATE: 93 BPM | DIASTOLIC BLOOD PRESSURE: 73 MMHG | SYSTOLIC BLOOD PRESSURE: 148 MMHG

## 2024-08-02 VITALS — HEART RATE: 86 BPM | RESPIRATION RATE: 9 BRPM | DIASTOLIC BLOOD PRESSURE: 74 MMHG | SYSTOLIC BLOOD PRESSURE: 155 MMHG

## 2024-08-02 VITALS — SYSTOLIC BLOOD PRESSURE: 150 MMHG | DIASTOLIC BLOOD PRESSURE: 76 MMHG | HEART RATE: 100 BPM | RESPIRATION RATE: 15 BRPM

## 2024-08-02 VITALS — SYSTOLIC BLOOD PRESSURE: 148 MMHG | HEART RATE: 91 BPM | DIASTOLIC BLOOD PRESSURE: 85 MMHG | RESPIRATION RATE: 15 BRPM

## 2024-08-02 VITALS
RESPIRATION RATE: 12 BRPM | SYSTOLIC BLOOD PRESSURE: 163 MMHG | TEMPERATURE: 98.7 F | HEART RATE: 93 BPM | DIASTOLIC BLOOD PRESSURE: 74 MMHG

## 2024-08-02 VITALS — SYSTOLIC BLOOD PRESSURE: 153 MMHG | RESPIRATION RATE: 10 BRPM | HEART RATE: 84 BPM | DIASTOLIC BLOOD PRESSURE: 71 MMHG

## 2024-08-02 VITALS — HEART RATE: 100 BPM | DIASTOLIC BLOOD PRESSURE: 75 MMHG | RESPIRATION RATE: 13 BRPM | SYSTOLIC BLOOD PRESSURE: 146 MMHG

## 2024-08-02 VITALS — HEART RATE: 91 BPM | SYSTOLIC BLOOD PRESSURE: 137 MMHG | RESPIRATION RATE: 12 BRPM | DIASTOLIC BLOOD PRESSURE: 68 MMHG

## 2024-08-02 VITALS — HEART RATE: 95 BPM | DIASTOLIC BLOOD PRESSURE: 85 MMHG | RESPIRATION RATE: 17 BRPM | SYSTOLIC BLOOD PRESSURE: 175 MMHG

## 2024-08-02 VITALS — DIASTOLIC BLOOD PRESSURE: 75 MMHG | SYSTOLIC BLOOD PRESSURE: 169 MMHG | HEART RATE: 90 BPM | RESPIRATION RATE: 14 BRPM

## 2024-08-02 VITALS — DIASTOLIC BLOOD PRESSURE: 65 MMHG | RESPIRATION RATE: 27 BRPM | HEART RATE: 91 BPM | SYSTOLIC BLOOD PRESSURE: 150 MMHG

## 2024-08-02 VITALS — SYSTOLIC BLOOD PRESSURE: 142 MMHG | DIASTOLIC BLOOD PRESSURE: 71 MMHG | HEART RATE: 94 BPM | RESPIRATION RATE: 18 BRPM

## 2024-08-02 VITALS — HEART RATE: 90 BPM | OXYGEN SATURATION: 97 % | RESPIRATION RATE: 20 BRPM

## 2024-08-02 VITALS
TEMPERATURE: 97.6 F | DIASTOLIC BLOOD PRESSURE: 76 MMHG | HEART RATE: 89 BPM | OXYGEN SATURATION: 96 % | SYSTOLIC BLOOD PRESSURE: 148 MMHG

## 2024-08-02 VITALS — DIASTOLIC BLOOD PRESSURE: 70 MMHG | SYSTOLIC BLOOD PRESSURE: 160 MMHG | HEART RATE: 89 BPM | RESPIRATION RATE: 10 BRPM

## 2024-08-02 VITALS — SYSTOLIC BLOOD PRESSURE: 179 MMHG | HEART RATE: 93 BPM | DIASTOLIC BLOOD PRESSURE: 83 MMHG | RESPIRATION RATE: 15 BRPM

## 2024-08-02 VITALS — SYSTOLIC BLOOD PRESSURE: 152 MMHG | HEART RATE: 99 BPM | RESPIRATION RATE: 23 BRPM | DIASTOLIC BLOOD PRESSURE: 84 MMHG

## 2024-08-02 LAB
BUN SERPL-MCNC: 47 MG/DL (ref 9–23)
CALCIUM SERPL-MCNC: 9.6 MG/DL (ref 8.7–10.4)
CHLORIDE SERPL-SCNC: 103 MEQ/L (ref 98–107)
CO2 SERPL-SCNC: 24 MEQ/L (ref 21–32)
CREAT SERPL-MCNC: 1.4 MG/DL (ref 0.6–1)
ERYTHROCYTE [DISTWIDTH] IN BLOOD BY AUTOMATED COUNT: 22.9 % (ref 11.6–14.6)
GLUCOSE SERPL-MCNC: 141 MG/DL (ref 70–105)
HCT VFR BLD AUTO: 30.5 % (ref 36–48)
HGB BLD-MCNC: 8.9 G/DL (ref 12–16)
MANUAL DIF COMMENT BLD-IMP: 1
MCH RBC QN AUTO: 22.1 PG (ref 28–32)
MCHC RBC AUTO-ENTMCNC: 29.2 G/DL (ref 31–37)
MCV RBC AUTO: 75.6 FL (ref 81–99)
PLATELET # BLD AUTO: 282 X1000/UL (ref 130–400)
PMV BLD AUTO: 7.7 FL (ref 7.4–10.4)
POTASSIUM SERPL-SCNC: 4.5 MEQ/L (ref 3.5–5.1)
RBC # BLD AUTO: 4.04 MILL/UL (ref 4.2–5.4)
SODIUM SERPL-SCNC: 133 MEQ/L (ref 136–145)
WBC # BLD: 12.3 X1000/UL (ref 4.5–11)

## 2024-08-02 RX ADMIN — SODIUM CHLORIDE NR MG: 9 INJECTION, SOLUTION INTRAVENOUS at 09:26

## 2024-08-03 LAB
ANISOCYTOSIS BLD QL: (no result)
HYPOCHROMIA BLD QL: (no result)
LYMPHOCYTES NFR BLD MANUAL: 2 % (ref 20–60)
MICROCYTES BLD QL SMEAR: (no result)
MONOCYTES NFR BLD MANUAL: 5 % (ref 2–8)
NEUTS SEG NFR BLD MANUAL: 93 % (ref 45–75)
PLATELET # BLD EST: NORMAL 10*3/UL
WBC NRBC COR # BLD AUTO: 1 /100 WBC

## 2024-08-15 ENCOUNTER — HOSPITAL ENCOUNTER (INPATIENT)
Dept: HOSPITAL 87 - MICUSO | Age: 64
LOS: 2 days | Discharge: HOME | DRG: 915 | End: 2024-08-17
Attending: STUDENT IN AN ORGANIZED HEALTH CARE EDUCATION/TRAINING PROGRAM | Admitting: STUDENT IN AN ORGANIZED HEALTH CARE EDUCATION/TRAINING PROGRAM
Payer: MEDICARE

## 2024-08-15 VITALS
DIASTOLIC BLOOD PRESSURE: 73 MMHG | HEART RATE: 116 BPM | OXYGEN SATURATION: 100 % | SYSTOLIC BLOOD PRESSURE: 173 MMHG | RESPIRATION RATE: 18 BRPM

## 2024-08-15 VITALS
RESPIRATION RATE: 18 BRPM | OXYGEN SATURATION: 100 % | HEART RATE: 110 BPM | SYSTOLIC BLOOD PRESSURE: 163 MMHG | DIASTOLIC BLOOD PRESSURE: 68 MMHG

## 2024-08-15 VITALS
SYSTOLIC BLOOD PRESSURE: 97 MMHG | DIASTOLIC BLOOD PRESSURE: 54 MMHG | HEART RATE: 106 BPM | RESPIRATION RATE: 18 BRPM | OXYGEN SATURATION: 100 %

## 2024-08-15 VITALS
SYSTOLIC BLOOD PRESSURE: 107 MMHG | HEART RATE: 105 BPM | RESPIRATION RATE: 18 BRPM | DIASTOLIC BLOOD PRESSURE: 61 MMHG | OXYGEN SATURATION: 100 %

## 2024-08-15 VITALS
SYSTOLIC BLOOD PRESSURE: 157 MMHG | DIASTOLIC BLOOD PRESSURE: 75 MMHG | HEART RATE: 115 BPM | RESPIRATION RATE: 19 BRPM | OXYGEN SATURATION: 100 %

## 2024-08-15 VITALS
RESPIRATION RATE: 18 BRPM | HEART RATE: 112 BPM | DIASTOLIC BLOOD PRESSURE: 69 MMHG | OXYGEN SATURATION: 100 % | SYSTOLIC BLOOD PRESSURE: 150 MMHG

## 2024-08-15 VITALS
OXYGEN SATURATION: 100 % | DIASTOLIC BLOOD PRESSURE: 55 MMHG | SYSTOLIC BLOOD PRESSURE: 98 MMHG | HEART RATE: 105 BPM | RESPIRATION RATE: 18 BRPM

## 2024-08-15 VITALS
RESPIRATION RATE: 18 BRPM | OXYGEN SATURATION: 100 % | HEART RATE: 106 BPM | SYSTOLIC BLOOD PRESSURE: 133 MMHG | DIASTOLIC BLOOD PRESSURE: 68 MMHG

## 2024-08-15 VITALS
HEART RATE: 106 BPM | RESPIRATION RATE: 18 BRPM | SYSTOLIC BLOOD PRESSURE: 124 MMHG | OXYGEN SATURATION: 100 % | DIASTOLIC BLOOD PRESSURE: 66 MMHG

## 2024-08-15 VITALS
HEART RATE: 108 BPM | DIASTOLIC BLOOD PRESSURE: 73 MMHG | RESPIRATION RATE: 24 BRPM | SYSTOLIC BLOOD PRESSURE: 173 MMHG | TEMPERATURE: 98.6 F

## 2024-08-15 VITALS
HEART RATE: 109 BPM | SYSTOLIC BLOOD PRESSURE: 167 MMHG | DIASTOLIC BLOOD PRESSURE: 69 MMHG | RESPIRATION RATE: 18 BRPM | OXYGEN SATURATION: 100 %

## 2024-08-15 VITALS — OXYGEN SATURATION: 100 % | RESPIRATION RATE: 19 BRPM | HEART RATE: 116 BPM

## 2024-08-15 VITALS
RESPIRATION RATE: 18 BRPM | SYSTOLIC BLOOD PRESSURE: 164 MMHG | OXYGEN SATURATION: 100 % | HEART RATE: 114 BPM | TEMPERATURE: 98.51 F | DIASTOLIC BLOOD PRESSURE: 79 MMHG

## 2024-08-15 VITALS
RESPIRATION RATE: 18 BRPM | SYSTOLIC BLOOD PRESSURE: 172 MMHG | OXYGEN SATURATION: 100 % | HEART RATE: 103 BPM | DIASTOLIC BLOOD PRESSURE: 73 MMHG

## 2024-08-15 VITALS — RESPIRATION RATE: 18 BRPM | HEART RATE: 106 BPM | OXYGEN SATURATION: 100 %

## 2024-08-15 VITALS
DIASTOLIC BLOOD PRESSURE: 55 MMHG | OXYGEN SATURATION: 100 % | HEART RATE: 107 BPM | SYSTOLIC BLOOD PRESSURE: 97 MMHG | RESPIRATION RATE: 18 BRPM

## 2024-08-15 VITALS — HEART RATE: 107 BPM | OXYGEN SATURATION: 100 %

## 2024-08-15 VITALS — HEIGHT: 63 IN | WEIGHT: 185.13 LBS | BODY MASS INDEX: 32.8 KG/M2

## 2024-08-15 VITALS
SYSTOLIC BLOOD PRESSURE: 120 MMHG | RESPIRATION RATE: 18 BRPM | OXYGEN SATURATION: 100 % | DIASTOLIC BLOOD PRESSURE: 65 MMHG | HEART RATE: 105 BPM

## 2024-08-15 VITALS — SYSTOLIC BLOOD PRESSURE: 99 MMHG | RESPIRATION RATE: 18 BRPM | HEART RATE: 105 BPM | DIASTOLIC BLOOD PRESSURE: 68 MMHG

## 2024-08-15 VITALS
DIASTOLIC BLOOD PRESSURE: 69 MMHG | SYSTOLIC BLOOD PRESSURE: 117 MMHG | HEART RATE: 105 BPM | OXYGEN SATURATION: 100 % | RESPIRATION RATE: 24 BRPM

## 2024-08-15 VITALS — RESPIRATION RATE: 18 BRPM | OXYGEN SATURATION: 100 % | HEART RATE: 89 BPM

## 2024-08-15 VITALS — HEART RATE: 62 BPM

## 2024-08-15 DIAGNOSIS — J84.9: ICD-10-CM

## 2024-08-15 DIAGNOSIS — I48.0: ICD-10-CM

## 2024-08-15 DIAGNOSIS — I50.33: ICD-10-CM
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
## 2024-08-15 DIAGNOSIS — D64.9: ICD-10-CM

## 2024-08-15 DIAGNOSIS — Z95.0: ICD-10-CM

## 2024-08-15 DIAGNOSIS — J96.01: ICD-10-CM

## 2024-08-15 DIAGNOSIS — T36.1X5A: ICD-10-CM

## 2024-08-15 DIAGNOSIS — I11.0: ICD-10-CM

## 2024-08-15 DIAGNOSIS — I35.1: ICD-10-CM

## 2024-08-15 DIAGNOSIS — T88.8XXA: ICD-10-CM

## 2024-08-15 DIAGNOSIS — Z79.899: ICD-10-CM
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
## 2024-08-15 DIAGNOSIS — F32.A: ICD-10-CM

## 2024-08-15 DIAGNOSIS — Z88.1: ICD-10-CM

## 2024-08-15 DIAGNOSIS — T78.2XXA: Primary | ICD-10-CM

## 2024-08-15 DIAGNOSIS — Z99.11: ICD-10-CM

## 2024-08-15 DIAGNOSIS — Z87.891: ICD-10-CM

## 2024-08-15 DIAGNOSIS — Z79.01: ICD-10-CM

## 2024-08-15 DIAGNOSIS — Z95.3: ICD-10-CM

## 2024-08-15 DIAGNOSIS — D50.9: ICD-10-CM
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
## 2024-08-15 LAB
ANISOCYTOSIS BLD QL: (no result)
APPEARANCE UR: CLEAR
BACTERIA #/AREA URNS AUTO: (no result) /[HPF]
BASE EXCESS BLDA CALC-SCNC: -0.3 MMOL/L (ref -2–2)
BG VENT RATE: 18 SET
BUN SERPL-MCNC: 22 MG/DL (ref 9–23)
CALCIUM SERPL-MCNC: 8.7 MG/DL (ref 8.7–10.4)
CARDIAC TROPONIN I PNL SERPL HS: 115 NG/L (ref 3–34)
CHLORIDE SERPL-SCNC: 106 MEQ/L (ref 98–107)
CO2 SERPL-SCNC: 23 MEQ/L (ref 21–32)
COHGB MFR BLDA: 0.4 % (ref 0.5–1.5)
COLOR UR: YELLOW
CREAT SERPL-MCNC: 1.2 MG/DL (ref 0.6–1)
DO-HGB MFR BLDA: 0.3 % (ref 0–5)
EOSINOPHIL NFR BLD MANUAL: 1 % (ref 0–5)
ERYTHROCYTE [DISTWIDTH] IN BLOOD BY AUTOMATED COUNT: 25.5 % (ref 11.6–14.6)
GLUCOSE SERPL-MCNC: 133 MG/DL (ref 70–105)
GLUCOSE UR STRIP.AUTO-MCNC: NEGATIVE MG/DL
HCO3 BLDA-SCNC: 25.4 MMOL/L (ref 22–26)
HCT VFR BLD AUTO: 35.3 % (ref 36–48)
HGB BLD-MCNC: 9.6 G/DL (ref 12–16)
HGB BLDA OXIMETRY-MCNC: 10.5 G/DL (ref 12–18)
HGB UR QL STRIP: NEGATIVE
HYALINE CASTS #/AREA URNS AUTO: (no result) /LPF
INHALED O2 CONCENTRATION: 100 %
INTRINSIC PEEP RESPIRATORY: 5 CMH2O
KETONES UR STRIP-MCNC: NEGATIVE MG/DL
LEUKOCYTE ESTERASE UR QL STRIP: NEGATIVE
LYMPHOCYTES NFR BLD MANUAL: 5 % (ref 20–60)
MANUAL DIF COMMENT BLD-IMP: 1
MCH RBC QN AUTO: 22.2 PG (ref 28–32)
MCHC RBC AUTO-ENTMCNC: 27.1 G/DL (ref 31–37)
MCV RBC AUTO: 81.8 FL (ref 81–99)
METHGB MFR BLDA: 0.3 % (ref 0–1.5)
MONOCYTES NFR BLD MANUAL: 2 % (ref 2–8)
NEUTS BAND NFR BLD MANUAL: 2 % (ref 1–6)
NEUTS SEG NFR BLD MANUAL: 90 % (ref 45–75)
NITRITE UR QL STRIP: NEGATIVE
OVALOCYTES BLD QL SMEAR: (no result)
OXYHGB MFR BLDA: 99 % (ref 94–97)
PCO2 TEMP ADJ BLDA: 45.8 MMHG (ref 35–45)
PEEP SETTING VENT: 450 ML
PH TEMP ADJ BLDA: 7.36 [PH] (ref 7.35–7.45)
PH UR STRIP: 5 [PH] (ref 4.5–8)
PHOSPHATE SERPL-MCNC: 4 MG/DL (ref 2.5–4.9)
PLATELET # BLD AUTO: 338 X1000/UL (ref 130–400)
PLATELET # BLD EST: NORMAL 10*3/UL
PMV BLD AUTO: 7.2 FL (ref 7.4–10.4)
PO2 TEMP ADJ BLDA: 332.4 MMHG (ref 75–100)
POTASSIUM SERPL-SCNC: 3.4 MEQ/L (ref 3.5–5.1)
PROT UR QL STRIP: (no result)
RBC # BLD AUTO: 4.31 MILL/UL (ref 4.2–5.4)
RBC #/AREA URNS AUTO: (no result) /HPF (ref 0–2)
SAO2 % BLDA: 99.7 % (ref 92–98.5)
SODIUM SERPL-SCNC: 136 MEQ/L (ref 136–145)
SP GR UR STRIP: 1.02 (ref 1–1.03)
SPECIMEN DRAWN FROM PATIENT: (no result)
SQUAMOUS #/AREA URNS AUTO: (no result) /LPF
UROBILINOGEN UR STRIP-MCNC: 1 E.U./DL (ref 0.2–1)
VENTILATION MODE VENT: (no result)
WBC # BLD: 24.9 X1000/UL (ref 4.5–11)
WBC #/AREA URNS AUTO: (no result) /HPF (ref 0–2)

## 2024-08-15 PROCEDURE — 83735 ASSAY OF MAGNESIUM: CPT

## 2024-08-15 PROCEDURE — 84439 ASSAY OF FREE THYROXINE: CPT

## 2024-08-15 PROCEDURE — 85025 COMPLETE CBC W/AUTO DIFF WBC: CPT

## 2024-08-15 PROCEDURE — 36600 WITHDRAWAL OF ARTERIAL BLOOD: CPT

## 2024-08-15 PROCEDURE — 84443 ASSAY THYROID STIM HORMONE: CPT

## 2024-08-15 PROCEDURE — 84100 ASSAY OF PHOSPHORUS: CPT

## 2024-08-15 PROCEDURE — 84478 ASSAY OF TRIGLYCERIDES: CPT

## 2024-08-15 PROCEDURE — 86850 RBC ANTIBODY SCREEN: CPT

## 2024-08-15 PROCEDURE — 94003 VENT MGMT INPAT SUBQ DAY: CPT

## 2024-08-15 PROCEDURE — 81003 URINALYSIS AUTO W/O SCOPE: CPT

## 2024-08-15 PROCEDURE — 82805 BLOOD GASES W/O2 SATURATION: CPT

## 2024-08-15 PROCEDURE — 71045 X-RAY EXAM CHEST 1 VIEW: CPT

## 2024-08-15 PROCEDURE — 86870 RBC ANTIBODY IDENTIFICATION: CPT

## 2024-08-15 PROCEDURE — 86900 BLOOD TYPING SEROLOGIC ABO: CPT

## 2024-08-15 PROCEDURE — 36415 COLL VENOUS BLD VENIPUNCTURE: CPT

## 2024-08-15 PROCEDURE — 84484 ASSAY OF TROPONIN QUANT: CPT

## 2024-08-15 PROCEDURE — 97162 PT EVAL MOD COMPLEX 30 MIN: CPT

## 2024-08-15 PROCEDURE — 85027 COMPLETE CBC AUTOMATED: CPT

## 2024-08-15 PROCEDURE — 83550 IRON BINDING TEST: CPT

## 2024-08-15 PROCEDURE — 86920 COMPATIBILITY TEST SPIN: CPT

## 2024-08-15 PROCEDURE — 5A1945Z RESPIRATORY VENTILATION, 24-96 CONSECUTIVE HOURS: ICD-10-PCS

## 2024-08-15 PROCEDURE — 83540 ASSAY OF IRON: CPT

## 2024-08-15 PROCEDURE — 82375 ASSAY CARBOXYHB QUANT: CPT

## 2024-08-15 PROCEDURE — 93005 ELECTROCARDIOGRAM TRACING: CPT

## 2024-08-15 PROCEDURE — 82962 GLUCOSE BLOOD TEST: CPT

## 2024-08-15 PROCEDURE — 0BH17EZ INSERTION OF ENDOTRACHEAL AIRWAY INTO TRACHEA, VIA NATURAL OR ARTIFICIAL OPENING: ICD-10-PCS

## 2024-08-15 PROCEDURE — 80048 BASIC METABOLIC PNL TOTAL CA: CPT

## 2024-08-15 RX ADMIN — METHYLPREDNISOLONE SODIUM SUCCINATE SCH MG: 125 INJECTION, POWDER, FOR SOLUTION INTRAMUSCULAR; INTRAVENOUS at 22:13

## 2024-08-15 RX ADMIN — PROPOFOL PRN MLS/HR: 10 INJECTION, EMULSION INTRAVENOUS at 19:00

## 2024-08-15 RX ADMIN — PANTOPRAZOLE SODIUM SCH MG: 40 INJECTION, POWDER, FOR SOLUTION INTRAVENOUS at 20:44

## 2024-08-15 RX ADMIN — DEXTROSE SCH MLS/HR: 5 SOLUTION INTRAVENOUS at 20:44

## 2024-08-15 RX ADMIN — APIXABAN SCH MG: 2.5 TABLET, FILM COATED ORAL at 20:59

## 2024-08-15 RX ADMIN — ATORVASTATIN CALCIUM SCH MG: 10 TABLET, FILM COATED ORAL at 20:59

## 2024-08-16 VITALS
SYSTOLIC BLOOD PRESSURE: 113 MMHG | HEART RATE: 99 BPM | RESPIRATION RATE: 18 BRPM | OXYGEN SATURATION: 100 % | DIASTOLIC BLOOD PRESSURE: 72 MMHG

## 2024-08-16 VITALS — RESPIRATION RATE: 18 BRPM | OXYGEN SATURATION: 100 % | HEART RATE: 103 BPM

## 2024-08-16 VITALS
DIASTOLIC BLOOD PRESSURE: 74 MMHG | SYSTOLIC BLOOD PRESSURE: 120 MMHG | OXYGEN SATURATION: 100 % | RESPIRATION RATE: 18 BRPM | HEART RATE: 100 BPM | TEMPERATURE: 98.1 F

## 2024-08-16 VITALS
SYSTOLIC BLOOD PRESSURE: 114 MMHG | OXYGEN SATURATION: 100 % | RESPIRATION RATE: 19 BRPM | DIASTOLIC BLOOD PRESSURE: 67 MMHG | TEMPERATURE: 98.21 F | HEART RATE: 102 BPM

## 2024-08-16 VITALS
RESPIRATION RATE: 18 BRPM | HEART RATE: 103 BPM | SYSTOLIC BLOOD PRESSURE: 103 MMHG | OXYGEN SATURATION: 100 % | DIASTOLIC BLOOD PRESSURE: 66 MMHG

## 2024-08-16 VITALS
SYSTOLIC BLOOD PRESSURE: 114 MMHG | DIASTOLIC BLOOD PRESSURE: 71 MMHG | HEART RATE: 99 BPM | OXYGEN SATURATION: 100 % | RESPIRATION RATE: 18 BRPM

## 2024-08-16 VITALS
HEART RATE: 104 BPM | DIASTOLIC BLOOD PRESSURE: 57 MMHG | RESPIRATION RATE: 18 BRPM | OXYGEN SATURATION: 100 % | SYSTOLIC BLOOD PRESSURE: 103 MMHG

## 2024-08-16 VITALS
DIASTOLIC BLOOD PRESSURE: 71 MMHG | OXYGEN SATURATION: 100 % | TEMPERATURE: 98.01 F | RESPIRATION RATE: 21 BRPM | SYSTOLIC BLOOD PRESSURE: 129 MMHG | HEART RATE: 104 BPM

## 2024-08-16 VITALS
OXYGEN SATURATION: 100 % | SYSTOLIC BLOOD PRESSURE: 97 MMHG | HEART RATE: 103 BPM | RESPIRATION RATE: 18 BRPM | DIASTOLIC BLOOD PRESSURE: 59 MMHG

## 2024-08-16 VITALS
SYSTOLIC BLOOD PRESSURE: 117 MMHG | HEART RATE: 98 BPM | OXYGEN SATURATION: 100 % | RESPIRATION RATE: 18 BRPM | DIASTOLIC BLOOD PRESSURE: 74 MMHG

## 2024-08-16 VITALS
SYSTOLIC BLOOD PRESSURE: 102 MMHG | DIASTOLIC BLOOD PRESSURE: 62 MMHG | RESPIRATION RATE: 18 BRPM | HEART RATE: 104 BPM | OXYGEN SATURATION: 100 %

## 2024-08-16 VITALS
SYSTOLIC BLOOD PRESSURE: 99 MMHG | HEART RATE: 105 BPM | DIASTOLIC BLOOD PRESSURE: 55 MMHG | RESPIRATION RATE: 18 BRPM | OXYGEN SATURATION: 100 %

## 2024-08-16 VITALS
OXYGEN SATURATION: 100 % | RESPIRATION RATE: 18 BRPM | SYSTOLIC BLOOD PRESSURE: 104 MMHG | DIASTOLIC BLOOD PRESSURE: 67 MMHG | HEART RATE: 103 BPM

## 2024-08-16 VITALS — OXYGEN SATURATION: 100 % | HEART RATE: 99 BPM | RESPIRATION RATE: 20 BRPM

## 2024-08-16 VITALS
OXYGEN SATURATION: 100 % | RESPIRATION RATE: 18 BRPM | HEART RATE: 104 BPM | DIASTOLIC BLOOD PRESSURE: 63 MMHG | SYSTOLIC BLOOD PRESSURE: 96 MMHG

## 2024-08-16 VITALS
RESPIRATION RATE: 18 BRPM | SYSTOLIC BLOOD PRESSURE: 99 MMHG | DIASTOLIC BLOOD PRESSURE: 61 MMHG | HEART RATE: 103 BPM | OXYGEN SATURATION: 100 %

## 2024-08-16 VITALS
DIASTOLIC BLOOD PRESSURE: 65 MMHG | HEART RATE: 104 BPM | SYSTOLIC BLOOD PRESSURE: 111 MMHG | RESPIRATION RATE: 18 BRPM | OXYGEN SATURATION: 100 %

## 2024-08-16 VITALS
SYSTOLIC BLOOD PRESSURE: 90 MMHG | RESPIRATION RATE: 18 BRPM | DIASTOLIC BLOOD PRESSURE: 57 MMHG | OXYGEN SATURATION: 100 % | HEART RATE: 105 BPM

## 2024-08-16 VITALS
RESPIRATION RATE: 22 BRPM | DIASTOLIC BLOOD PRESSURE: 68 MMHG | SYSTOLIC BLOOD PRESSURE: 119 MMHG | HEART RATE: 104 BPM | OXYGEN SATURATION: 100 %

## 2024-08-16 VITALS
RESPIRATION RATE: 18 BRPM | OXYGEN SATURATION: 100 % | DIASTOLIC BLOOD PRESSURE: 55 MMHG | HEART RATE: 105 BPM | SYSTOLIC BLOOD PRESSURE: 91 MMHG

## 2024-08-16 VITALS
RESPIRATION RATE: 18 BRPM | DIASTOLIC BLOOD PRESSURE: 58 MMHG | HEART RATE: 108 BPM | SYSTOLIC BLOOD PRESSURE: 99 MMHG | OXYGEN SATURATION: 99 %

## 2024-08-16 VITALS — HEART RATE: 114 BPM | OXYGEN SATURATION: 100 % | RESPIRATION RATE: 18 BRPM

## 2024-08-16 VITALS
RESPIRATION RATE: 18 BRPM | DIASTOLIC BLOOD PRESSURE: 86 MMHG | HEART RATE: 98 BPM | SYSTOLIC BLOOD PRESSURE: 149 MMHG | OXYGEN SATURATION: 100 %

## 2024-08-16 VITALS — HEART RATE: 103 BPM | RESPIRATION RATE: 19 BRPM | OXYGEN SATURATION: 100 %

## 2024-08-16 VITALS
DIASTOLIC BLOOD PRESSURE: 73 MMHG | RESPIRATION RATE: 25 BRPM | SYSTOLIC BLOOD PRESSURE: 117 MMHG | HEART RATE: 102 BPM | OXYGEN SATURATION: 100 %

## 2024-08-16 VITALS
SYSTOLIC BLOOD PRESSURE: 116 MMHG | DIASTOLIC BLOOD PRESSURE: 72 MMHG | RESPIRATION RATE: 18 BRPM | HEART RATE: 98 BPM | OXYGEN SATURATION: 100 %

## 2024-08-16 VITALS
OXYGEN SATURATION: 100 % | DIASTOLIC BLOOD PRESSURE: 66 MMHG | SYSTOLIC BLOOD PRESSURE: 109 MMHG | HEART RATE: 100 BPM | RESPIRATION RATE: 18 BRPM

## 2024-08-16 VITALS
HEART RATE: 104 BPM | SYSTOLIC BLOOD PRESSURE: 96 MMHG | RESPIRATION RATE: 18 BRPM | DIASTOLIC BLOOD PRESSURE: 56 MMHG | OXYGEN SATURATION: 100 %

## 2024-08-16 VITALS
RESPIRATION RATE: 18 BRPM | DIASTOLIC BLOOD PRESSURE: 65 MMHG | SYSTOLIC BLOOD PRESSURE: 105 MMHG | OXYGEN SATURATION: 100 % | HEART RATE: 104 BPM

## 2024-08-16 VITALS
DIASTOLIC BLOOD PRESSURE: 76 MMHG | SYSTOLIC BLOOD PRESSURE: 137 MMHG | OXYGEN SATURATION: 100 % | RESPIRATION RATE: 27 BRPM | HEART RATE: 105 BPM | TEMPERATURE: 97.81 F

## 2024-08-16 VITALS
SYSTOLIC BLOOD PRESSURE: 100 MMHG | RESPIRATION RATE: 18 BRPM | OXYGEN SATURATION: 100 % | DIASTOLIC BLOOD PRESSURE: 60 MMHG | HEART RATE: 103 BPM

## 2024-08-16 VITALS
HEART RATE: 103 BPM | RESPIRATION RATE: 19 BRPM | SYSTOLIC BLOOD PRESSURE: 98 MMHG | DIASTOLIC BLOOD PRESSURE: 57 MMHG | OXYGEN SATURATION: 100 %

## 2024-08-16 VITALS
OXYGEN SATURATION: 100 % | SYSTOLIC BLOOD PRESSURE: 107 MMHG | RESPIRATION RATE: 18 BRPM | HEART RATE: 103 BPM | DIASTOLIC BLOOD PRESSURE: 62 MMHG

## 2024-08-16 VITALS
HEART RATE: 104 BPM | SYSTOLIC BLOOD PRESSURE: 95 MMHG | DIASTOLIC BLOOD PRESSURE: 60 MMHG | RESPIRATION RATE: 18 BRPM | OXYGEN SATURATION: 100 %

## 2024-08-16 VITALS
HEART RATE: 99 BPM | RESPIRATION RATE: 18 BRPM | SYSTOLIC BLOOD PRESSURE: 115 MMHG | OXYGEN SATURATION: 100 % | DIASTOLIC BLOOD PRESSURE: 71 MMHG

## 2024-08-16 VITALS
DIASTOLIC BLOOD PRESSURE: 67 MMHG | OXYGEN SATURATION: 100 % | HEART RATE: 103 BPM | RESPIRATION RATE: 18 BRPM | SYSTOLIC BLOOD PRESSURE: 111 MMHG

## 2024-08-16 VITALS
TEMPERATURE: 97.61 F | SYSTOLIC BLOOD PRESSURE: 123 MMHG | OXYGEN SATURATION: 100 % | RESPIRATION RATE: 18 BRPM | DIASTOLIC BLOOD PRESSURE: 68 MMHG | HEART RATE: 103 BPM

## 2024-08-16 VITALS
RESPIRATION RATE: 18 BRPM | SYSTOLIC BLOOD PRESSURE: 117 MMHG | DIASTOLIC BLOOD PRESSURE: 72 MMHG | OXYGEN SATURATION: 100 % | HEART RATE: 103 BPM

## 2024-08-16 VITALS — OXYGEN SATURATION: 100 % | RESPIRATION RATE: 18 BRPM | HEART RATE: 104 BPM

## 2024-08-16 VITALS — RESPIRATION RATE: 18 BRPM | OXYGEN SATURATION: 100 % | HEART RATE: 98 BPM

## 2024-08-16 VITALS
RESPIRATION RATE: 18 BRPM | DIASTOLIC BLOOD PRESSURE: 63 MMHG | SYSTOLIC BLOOD PRESSURE: 121 MMHG | OXYGEN SATURATION: 100 % | HEART RATE: 104 BPM

## 2024-08-16 VITALS
SYSTOLIC BLOOD PRESSURE: 101 MMHG | RESPIRATION RATE: 18 BRPM | DIASTOLIC BLOOD PRESSURE: 56 MMHG | HEART RATE: 104 BPM | OXYGEN SATURATION: 100 %

## 2024-08-16 VITALS — OXYGEN SATURATION: 100 % | RESPIRATION RATE: 18 BRPM | HEART RATE: 103 BPM

## 2024-08-16 VITALS
RESPIRATION RATE: 18 BRPM | HEART RATE: 104 BPM | OXYGEN SATURATION: 100 % | DIASTOLIC BLOOD PRESSURE: 58 MMHG | SYSTOLIC BLOOD PRESSURE: 91 MMHG

## 2024-08-16 VITALS
RESPIRATION RATE: 18 BRPM | OXYGEN SATURATION: 100 % | DIASTOLIC BLOOD PRESSURE: 59 MMHG | HEART RATE: 104 BPM | SYSTOLIC BLOOD PRESSURE: 94 MMHG

## 2024-08-16 VITALS
DIASTOLIC BLOOD PRESSURE: 64 MMHG | HEART RATE: 103 BPM | OXYGEN SATURATION: 100 % | RESPIRATION RATE: 18 BRPM | SYSTOLIC BLOOD PRESSURE: 102 MMHG

## 2024-08-16 VITALS
HEART RATE: 101 BPM | OXYGEN SATURATION: 100 % | DIASTOLIC BLOOD PRESSURE: 93 MMHG | RESPIRATION RATE: 22 BRPM | SYSTOLIC BLOOD PRESSURE: 126 MMHG

## 2024-08-16 VITALS
DIASTOLIC BLOOD PRESSURE: 55 MMHG | RESPIRATION RATE: 18 BRPM | HEART RATE: 104 BPM | OXYGEN SATURATION: 100 % | SYSTOLIC BLOOD PRESSURE: 87 MMHG

## 2024-08-16 VITALS — OXYGEN SATURATION: 100 % | HEART RATE: 102 BPM | RESPIRATION RATE: 19 BRPM

## 2024-08-16 VITALS
RESPIRATION RATE: 18 BRPM | DIASTOLIC BLOOD PRESSURE: 62 MMHG | HEART RATE: 102 BPM | OXYGEN SATURATION: 100 % | SYSTOLIC BLOOD PRESSURE: 99 MMHG

## 2024-08-16 VITALS
HEART RATE: 102 BPM | OXYGEN SATURATION: 100 % | DIASTOLIC BLOOD PRESSURE: 61 MMHG | RESPIRATION RATE: 18 BRPM | SYSTOLIC BLOOD PRESSURE: 100 MMHG

## 2024-08-16 VITALS
RESPIRATION RATE: 18 BRPM | DIASTOLIC BLOOD PRESSURE: 73 MMHG | OXYGEN SATURATION: 100 % | HEART RATE: 99 BPM | SYSTOLIC BLOOD PRESSURE: 124 MMHG

## 2024-08-16 VITALS
HEART RATE: 103 BPM | RESPIRATION RATE: 18 BRPM | OXYGEN SATURATION: 100 % | SYSTOLIC BLOOD PRESSURE: 99 MMHG | DIASTOLIC BLOOD PRESSURE: 59 MMHG

## 2024-08-16 VITALS — RESPIRATION RATE: 20 BRPM | OXYGEN SATURATION: 99 % | HEART RATE: 105 BPM

## 2024-08-16 VITALS
SYSTOLIC BLOOD PRESSURE: 107 MMHG | RESPIRATION RATE: 19 BRPM | OXYGEN SATURATION: 100 % | HEART RATE: 101 BPM | DIASTOLIC BLOOD PRESSURE: 63 MMHG

## 2024-08-16 VITALS
HEART RATE: 102 BPM | OXYGEN SATURATION: 100 % | SYSTOLIC BLOOD PRESSURE: 130 MMHG | RESPIRATION RATE: 18 BRPM | DIASTOLIC BLOOD PRESSURE: 71 MMHG

## 2024-08-16 VITALS
SYSTOLIC BLOOD PRESSURE: 108 MMHG | RESPIRATION RATE: 18 BRPM | OXYGEN SATURATION: 100 % | DIASTOLIC BLOOD PRESSURE: 61 MMHG | HEART RATE: 103 BPM

## 2024-08-16 VITALS — RESPIRATION RATE: 18 BRPM | OXYGEN SATURATION: 100 % | HEART RATE: 99 BPM

## 2024-08-16 VITALS
SYSTOLIC BLOOD PRESSURE: 114 MMHG | RESPIRATION RATE: 18 BRPM | HEART RATE: 100 BPM | DIASTOLIC BLOOD PRESSURE: 71 MMHG | OXYGEN SATURATION: 100 %

## 2024-08-16 LAB
ANISOCYTOSIS BLD QL: (no result)
BASE EXCESS BLDA CALC-SCNC: 0.7 MMOL/L (ref -2–2)
BG VENT RATE: 18 SET
BUN SERPL-MCNC: 29 MG/DL (ref 9–23)
CALCIUM SERPL-MCNC: 8.6 MG/DL (ref 8.7–10.4)
CARDIAC TROPONIN I PNL SERPL HS: 148 NG/L (ref 3–34)
CHLORIDE SERPL-SCNC: 105 MEQ/L (ref 98–107)
CO2 SERPL-SCNC: 26 MEQ/L (ref 21–32)
COHGB MFR BLDA: 0.4 % (ref 0.5–1.5)
CREAT SERPL-MCNC: 1 MG/DL (ref 0.6–1)
DO-HGB MFR BLDA: 0.5 % (ref 0–5)
ERYTHROCYTE [DISTWIDTH] IN BLOOD BY AUTOMATED COUNT: 24.8 % (ref 11.6–14.6)
GLUCOSE SERPL-MCNC: 123 MG/DL (ref 70–105)
HCO3 BLDA-SCNC: 23.1 MMOL/L (ref 22–26)
HCT VFR BLD AUTO: 28.1 % (ref 36–48)
HGB BLD-MCNC: 8.1 G/DL (ref 12–16)
HGB BLDA OXIMETRY-MCNC: 8.8 G/DL (ref 12–18)
HYPOCHROMIA BLD QL: (no result)
INHALED O2 CONCENTRATION: 40 %
INTRINSIC PEEP RESPIRATORY: 5 CMH2O
LYMPHOCYTES NFR BLD MANUAL: 1 % (ref 20–60)
MANUAL DIF COMMENT BLD-IMP: 1
MCH RBC QN AUTO: 22.6 PG (ref 28–32)
MCHC RBC AUTO-ENTMCNC: 28.7 G/DL (ref 31–37)
MCV RBC AUTO: 78.5 FL (ref 81–99)
METHGB MFR BLDA: 0.2 % (ref 0–1.5)
MICROCYTES BLD QL SMEAR: (no result)
MONOCYTES NFR BLD MANUAL: 1 % (ref 2–8)
NEUTS SEG NFR BLD MANUAL: 98 % (ref 45–75)
OXYHGB MFR BLDA: 98.9 % (ref 94–97)
PCO2 TEMP ADJ BLDA: 28.7 MMHG (ref 35–45)
PEEP SETTING VENT: 450 ML
PH TEMP ADJ BLDA: 7.52 [PH] (ref 7.35–7.45)
PLATELET # BLD AUTO: 263 X1000/UL (ref 130–400)
PLATELET # BLD EST: NORMAL 10*3/UL
PMV BLD AUTO: 7.6 FL (ref 7.4–10.4)
PO2 TEMP ADJ BLDA: 166.7 MMHG (ref 75–100)
POTASSIUM SERPL-SCNC: 4.3 MEQ/L (ref 3.5–5.1)
RBC # BLD AUTO: 3.57 MILL/UL (ref 4.2–5.4)
SAO2 % BLDA: 99.5 % (ref 92–98.5)
SODIUM SERPL-SCNC: 139 MEQ/L (ref 136–145)
SPECIMEN DRAWN FROM PATIENT: (no result)
T4 FREE SERPL-MCNC: 1.43 NG/DL (ref 0.89–1.76)
TRIGL SERPL-MCNC: 111 MG/DL (ref 0–150)
TSH SERPL DL<=0.005 MIU/L-ACNC: 0.85 UIU/ML (ref 0.55–4.78)
VENTILATION MODE VENT: (no result)
WBC # BLD: 21.1 X1000/UL (ref 4.5–11)

## 2024-08-16 RX ADMIN — FAMOTIDINE SCH MG: 10 INJECTION INTRAVENOUS at 10:43

## 2024-08-16 RX ADMIN — ALLOPURINOL SCH MG: 100 TABLET ORAL at 08:29

## 2024-08-16 RX ADMIN — DIPHENHYDRAMINE HYDROCHLORIDE SCH MG: 50 INJECTION, SOLUTION INTRAMUSCULAR; INTRAVENOUS at 10:44

## 2024-08-16 RX ADMIN — HYDROCODONE BITARTRATE AND ACETAMINOPHEN PRN TAB: 10; 325 TABLET ORAL at 08:30

## 2024-08-17 VITALS
HEART RATE: 102 BPM | RESPIRATION RATE: 18 BRPM | SYSTOLIC BLOOD PRESSURE: 140 MMHG | OXYGEN SATURATION: 94 % | DIASTOLIC BLOOD PRESSURE: 72 MMHG

## 2024-08-17 VITALS
HEART RATE: 100 BPM | RESPIRATION RATE: 18 BRPM | DIASTOLIC BLOOD PRESSURE: 83 MMHG | OXYGEN SATURATION: 100 % | SYSTOLIC BLOOD PRESSURE: 137 MMHG

## 2024-08-17 VITALS
HEART RATE: 104 BPM | SYSTOLIC BLOOD PRESSURE: 162 MMHG | DIASTOLIC BLOOD PRESSURE: 85 MMHG | RESPIRATION RATE: 19 BRPM | OXYGEN SATURATION: 100 %

## 2024-08-17 VITALS
OXYGEN SATURATION: 95 % | RESPIRATION RATE: 15 BRPM | HEART RATE: 102 BPM | SYSTOLIC BLOOD PRESSURE: 141 MMHG | DIASTOLIC BLOOD PRESSURE: 95 MMHG

## 2024-08-17 VITALS
HEART RATE: 101 BPM | RESPIRATION RATE: 18 BRPM | OXYGEN SATURATION: 100 % | SYSTOLIC BLOOD PRESSURE: 146 MMHG | DIASTOLIC BLOOD PRESSURE: 73 MMHG

## 2024-08-17 VITALS
RESPIRATION RATE: 14 BRPM | DIASTOLIC BLOOD PRESSURE: 85 MMHG | SYSTOLIC BLOOD PRESSURE: 162 MMHG | HEART RATE: 98 BPM | OXYGEN SATURATION: 100 %

## 2024-08-17 VITALS
HEART RATE: 101 BPM | RESPIRATION RATE: 18 BRPM | OXYGEN SATURATION: 97 % | SYSTOLIC BLOOD PRESSURE: 144 MMHG | DIASTOLIC BLOOD PRESSURE: 81 MMHG

## 2024-08-17 VITALS
DIASTOLIC BLOOD PRESSURE: 77 MMHG | RESPIRATION RATE: 16 BRPM | OXYGEN SATURATION: 98 % | SYSTOLIC BLOOD PRESSURE: 131 MMHG | HEART RATE: 102 BPM

## 2024-08-17 VITALS
DIASTOLIC BLOOD PRESSURE: 73 MMHG | OXYGEN SATURATION: 100 % | SYSTOLIC BLOOD PRESSURE: 125 MMHG | RESPIRATION RATE: 18 BRPM | HEART RATE: 99 BPM

## 2024-08-17 VITALS
HEART RATE: 98 BPM | SYSTOLIC BLOOD PRESSURE: 157 MMHG | DIASTOLIC BLOOD PRESSURE: 90 MMHG | RESPIRATION RATE: 13 BRPM | OXYGEN SATURATION: 99 %

## 2024-08-17 VITALS
TEMPERATURE: 97.5 F | DIASTOLIC BLOOD PRESSURE: 73 MMHG | OXYGEN SATURATION: 100 % | HEART RATE: 99 BPM | SYSTOLIC BLOOD PRESSURE: 121 MMHG | RESPIRATION RATE: 18 BRPM

## 2024-08-17 VITALS
HEART RATE: 99 BPM | SYSTOLIC BLOOD PRESSURE: 125 MMHG | RESPIRATION RATE: 18 BRPM | OXYGEN SATURATION: 100 % | DIASTOLIC BLOOD PRESSURE: 76 MMHG

## 2024-08-17 VITALS
SYSTOLIC BLOOD PRESSURE: 123 MMHG | HEART RATE: 99 BPM | RESPIRATION RATE: 18 BRPM | DIASTOLIC BLOOD PRESSURE: 75 MMHG | OXYGEN SATURATION: 100 %

## 2024-08-17 VITALS
OXYGEN SATURATION: 100 % | SYSTOLIC BLOOD PRESSURE: 128 MMHG | HEART RATE: 102 BPM | DIASTOLIC BLOOD PRESSURE: 77 MMHG | RESPIRATION RATE: 16 BRPM

## 2024-08-17 VITALS
DIASTOLIC BLOOD PRESSURE: 106 MMHG | SYSTOLIC BLOOD PRESSURE: 137 MMHG | OXYGEN SATURATION: 94 % | RESPIRATION RATE: 17 BRPM | HEART RATE: 103 BPM

## 2024-08-17 VITALS
DIASTOLIC BLOOD PRESSURE: 82 MMHG | RESPIRATION RATE: 18 BRPM | HEART RATE: 100 BPM | SYSTOLIC BLOOD PRESSURE: 136 MMHG | OXYGEN SATURATION: 100 %

## 2024-08-17 VITALS
SYSTOLIC BLOOD PRESSURE: 128 MMHG | DIASTOLIC BLOOD PRESSURE: 101 MMHG | RESPIRATION RATE: 15 BRPM | HEART RATE: 102 BPM | OXYGEN SATURATION: 100 %

## 2024-08-17 VITALS
OXYGEN SATURATION: 100 % | SYSTOLIC BLOOD PRESSURE: 133 MMHG | RESPIRATION RATE: 18 BRPM | HEART RATE: 100 BPM | DIASTOLIC BLOOD PRESSURE: 81 MMHG

## 2024-08-17 VITALS
SYSTOLIC BLOOD PRESSURE: 133 MMHG | RESPIRATION RATE: 17 BRPM | OXYGEN SATURATION: 100 % | HEART RATE: 100 BPM | DIASTOLIC BLOOD PRESSURE: 81 MMHG

## 2024-08-17 VITALS
SYSTOLIC BLOOD PRESSURE: 142 MMHG | RESPIRATION RATE: 22 BRPM | DIASTOLIC BLOOD PRESSURE: 64 MMHG | HEART RATE: 114 BPM | OXYGEN SATURATION: 95 %

## 2024-08-17 VITALS
SYSTOLIC BLOOD PRESSURE: 147 MMHG | OXYGEN SATURATION: 100 % | HEART RATE: 100 BPM | RESPIRATION RATE: 18 BRPM | DIASTOLIC BLOOD PRESSURE: 87 MMHG

## 2024-08-17 VITALS
OXYGEN SATURATION: 100 % | DIASTOLIC BLOOD PRESSURE: 87 MMHG | RESPIRATION RATE: 18 BRPM | SYSTOLIC BLOOD PRESSURE: 146 MMHG | HEART RATE: 100 BPM

## 2024-08-17 VITALS
OXYGEN SATURATION: 100 % | RESPIRATION RATE: 18 BRPM | SYSTOLIC BLOOD PRESSURE: 134 MMHG | DIASTOLIC BLOOD PRESSURE: 79 MMHG | HEART RATE: 99 BPM

## 2024-08-17 VITALS
DIASTOLIC BLOOD PRESSURE: 79 MMHG | RESPIRATION RATE: 14 BRPM | SYSTOLIC BLOOD PRESSURE: 145 MMHG | OXYGEN SATURATION: 94 % | HEART RATE: 102 BPM

## 2024-08-17 VITALS
OXYGEN SATURATION: 100 % | DIASTOLIC BLOOD PRESSURE: 88 MMHG | HEART RATE: 100 BPM | SYSTOLIC BLOOD PRESSURE: 159 MMHG | RESPIRATION RATE: 25 BRPM

## 2024-08-17 VITALS
RESPIRATION RATE: 12 BRPM | OXYGEN SATURATION: 94 % | DIASTOLIC BLOOD PRESSURE: 85 MMHG | HEART RATE: 102 BPM | SYSTOLIC BLOOD PRESSURE: 132 MMHG

## 2024-08-17 VITALS
SYSTOLIC BLOOD PRESSURE: 133 MMHG | OXYGEN SATURATION: 100 % | HEART RATE: 99 BPM | RESPIRATION RATE: 16 BRPM | DIASTOLIC BLOOD PRESSURE: 78 MMHG

## 2024-08-17 VITALS
OXYGEN SATURATION: 100 % | SYSTOLIC BLOOD PRESSURE: 145 MMHG | HEART RATE: 102 BPM | RESPIRATION RATE: 18 BRPM | DIASTOLIC BLOOD PRESSURE: 75 MMHG

## 2024-08-17 VITALS
RESPIRATION RATE: 25 BRPM | HEART RATE: 101 BPM | OXYGEN SATURATION: 100 % | SYSTOLIC BLOOD PRESSURE: 153 MMHG | DIASTOLIC BLOOD PRESSURE: 99 MMHG

## 2024-08-17 VITALS
DIASTOLIC BLOOD PRESSURE: 79 MMHG | RESPIRATION RATE: 18 BRPM | OXYGEN SATURATION: 100 % | HEART RATE: 98 BPM | SYSTOLIC BLOOD PRESSURE: 131 MMHG

## 2024-08-17 VITALS
RESPIRATION RATE: 15 BRPM | DIASTOLIC BLOOD PRESSURE: 71 MMHG | OXYGEN SATURATION: 94 % | SYSTOLIC BLOOD PRESSURE: 127 MMHG | HEART RATE: 102 BPM

## 2024-08-17 VITALS
SYSTOLIC BLOOD PRESSURE: 139 MMHG | OXYGEN SATURATION: 96 % | DIASTOLIC BLOOD PRESSURE: 63 MMHG | HEART RATE: 103 BPM | RESPIRATION RATE: 15 BRPM

## 2024-08-17 VITALS
RESPIRATION RATE: 18 BRPM | DIASTOLIC BLOOD PRESSURE: 68 MMHG | SYSTOLIC BLOOD PRESSURE: 116 MMHG | OXYGEN SATURATION: 97 % | HEART RATE: 100 BPM

## 2024-08-17 VITALS
HEART RATE: 105 BPM | OXYGEN SATURATION: 94 % | SYSTOLIC BLOOD PRESSURE: 138 MMHG | DIASTOLIC BLOOD PRESSURE: 76 MMHG | RESPIRATION RATE: 18 BRPM

## 2024-08-17 VITALS
OXYGEN SATURATION: 100 % | RESPIRATION RATE: 18 BRPM | HEART RATE: 99 BPM | SYSTOLIC BLOOD PRESSURE: 151 MMHG | DIASTOLIC BLOOD PRESSURE: 85 MMHG

## 2024-08-17 VITALS
DIASTOLIC BLOOD PRESSURE: 83 MMHG | SYSTOLIC BLOOD PRESSURE: 163 MMHG | OXYGEN SATURATION: 100 % | RESPIRATION RATE: 18 BRPM | HEART RATE: 99 BPM

## 2024-08-17 VITALS
DIASTOLIC BLOOD PRESSURE: 75 MMHG | OXYGEN SATURATION: 100 % | HEART RATE: 99 BPM | RESPIRATION RATE: 18 BRPM | SYSTOLIC BLOOD PRESSURE: 122 MMHG

## 2024-08-17 VITALS
TEMPERATURE: 98.7 F | OXYGEN SATURATION: 95 % | HEART RATE: 101 BPM | DIASTOLIC BLOOD PRESSURE: 72 MMHG | SYSTOLIC BLOOD PRESSURE: 141 MMHG

## 2024-08-17 VITALS
HEART RATE: 100 BPM | OXYGEN SATURATION: 100 % | DIASTOLIC BLOOD PRESSURE: 80 MMHG | SYSTOLIC BLOOD PRESSURE: 125 MMHG | RESPIRATION RATE: 18 BRPM

## 2024-08-17 VITALS — RESPIRATION RATE: 18 BRPM | HEART RATE: 99 BPM | OXYGEN SATURATION: 100 %

## 2024-08-17 VITALS — HEART RATE: 98 BPM | RESPIRATION RATE: 25 BRPM | OXYGEN SATURATION: 100 %

## 2024-08-17 VITALS
DIASTOLIC BLOOD PRESSURE: 83 MMHG | SYSTOLIC BLOOD PRESSURE: 138 MMHG | RESPIRATION RATE: 18 BRPM | HEART RATE: 100 BPM | OXYGEN SATURATION: 100 %

## 2024-08-17 VITALS
DIASTOLIC BLOOD PRESSURE: 79 MMHG | SYSTOLIC BLOOD PRESSURE: 133 MMHG | OXYGEN SATURATION: 100 % | HEART RATE: 99 BPM | RESPIRATION RATE: 18 BRPM

## 2024-08-17 VITALS
RESPIRATION RATE: 23 BRPM | SYSTOLIC BLOOD PRESSURE: 158 MMHG | HEART RATE: 99 BPM | OXYGEN SATURATION: 100 % | DIASTOLIC BLOOD PRESSURE: 117 MMHG

## 2024-08-17 VITALS
SYSTOLIC BLOOD PRESSURE: 140 MMHG | RESPIRATION RATE: 21 BRPM | DIASTOLIC BLOOD PRESSURE: 84 MMHG | OXYGEN SATURATION: 100 % | HEART RATE: 99 BPM

## 2024-08-17 VITALS — HEART RATE: 99 BPM | OXYGEN SATURATION: 100 % | RESPIRATION RATE: 19 BRPM

## 2024-08-17 VITALS
RESPIRATION RATE: 28 BRPM | SYSTOLIC BLOOD PRESSURE: 159 MMHG | DIASTOLIC BLOOD PRESSURE: 87 MMHG | HEART RATE: 96 BPM | OXYGEN SATURATION: 100 %

## 2024-08-17 LAB
ANISOCYTOSIS BLD QL: (no result)
BASE EXCESS BLDA CALC-SCNC: -3.7 MMOL/L (ref -2–2)
BUN SERPL-MCNC: 36 MG/DL (ref 9–23)
CALCIUM SERPL-MCNC: 8.7 MG/DL (ref 8.7–10.4)
CARDIAC TROPONIN I PNL SERPL HS: 193 NG/L (ref 3–34)
CHLORIDE SERPL-SCNC: 108 MEQ/L (ref 98–107)
CO2 SERPL-SCNC: 23 MEQ/L (ref 21–32)
COHGB MFR BLDA: 0.6 % (ref 0.5–1.5)
CREAT SERPL-MCNC: 1.3 MG/DL (ref 0.6–1)
DO-HGB MFR BLDA: 0.5 % (ref 0–5)
ERYTHROCYTE [DISTWIDTH] IN BLOOD BY AUTOMATED COUNT: 25.1 % (ref 11.6–14.6)
ERYTHROCYTE [DISTWIDTH] IN BLOOD BY AUTOMATED COUNT: 26 % (ref 11.6–14.6)
GLUCOSE SERPL-MCNC: 156 MG/DL (ref 70–105)
HCO3 BLDA-SCNC: 20.2 MMOL/L (ref 22–26)
HCT VFR BLD AUTO: 22 % (ref 36–48)
HCT VFR BLD AUTO: 27.5 % (ref 36–48)
HGB BLD-MCNC: 6.8 G/DL (ref 12–16)
HGB BLD-MCNC: 8 G/DL (ref 12–16)
HGB BLDA OXIMETRY-MCNC: 8.5 G/DL (ref 12–18)
HYPOCHROMIA BLD QL: (no result)
INHALED O2 CONCENTRATION: 40 %
INR PPP: 1
INTRINSIC PEEP RESPIRATORY: 5 CMH2O
IRON SERPL-MCNC: 15 UG/DL (ref 50–170)
LYMPHOCYTES NFR BLD MANUAL: 8 % (ref 20–60)
MANUAL DIF COMMENT BLD-IMP: 1
MCH RBC QN AUTO: 23.5 PG (ref 28–32)
MCH RBC QN AUTO: 23.5 PG (ref 28–32)
MCHC RBC AUTO-ENTMCNC: 29.1 G/DL (ref 31–37)
MCHC RBC AUTO-ENTMCNC: 31.2 G/DL (ref 31–37)
MCV RBC AUTO: 75.5 FL (ref 81–99)
MCV RBC AUTO: 80.5 FL (ref 81–99)
METHGB MFR BLDA: 0.1 % (ref 0–1.5)
MICROCYTES BLD QL SMEAR: (no result)
MONOCYTES NFR BLD MANUAL: 1 % (ref 2–8)
NEUTS BAND NFR BLD MANUAL: 1 % (ref 1–6)
NEUTS SEG NFR BLD MANUAL: 90 % (ref 45–75)
OXYHGB MFR BLDA: 98.8 % (ref 94–97)
PCO2 TEMP ADJ BLDA: 31.5 MMHG (ref 35–45)
PH TEMP ADJ BLDA: 7.42 [PH] (ref 7.35–7.45)
PLATELET # BLD AUTO: 243 X1000/UL (ref 130–400)
PLATELET # BLD AUTO: 266 X1000/UL (ref 130–400)
PLATELET # BLD EST: NORMAL 10*3/UL
PMV BLD AUTO: 7.7 FL (ref 7.4–10.4)
PO2 TEMP ADJ BLDA: 139.1 MMHG (ref 75–100)
POTASSIUM SERPL-SCNC: 4 MEQ/L (ref 3.5–5.1)
PROTHROMBIN TIME: 11.4 SEC (ref 9.6–11)
RBC # BLD AUTO: 2.91 MILL/UL (ref 4.2–5.4)
RBC # BLD AUTO: 3.42 MILL/UL (ref 4.2–5.4)
SAO2 % BLDA: 99.5 % (ref 92–98.5)
SODIUM SERPL-SCNC: 138 MEQ/L (ref 136–145)
SPECIMEN DRAWN FROM PATIENT: (no result)
TIBC SERPL-MCNC: 295 UG/DL (ref 250–425)
TRIGL SERPL-MCNC: 138 MG/DL (ref 0–150)
VENTILATION MODE VENT: (no result)
WBC # BLD: 5.2 X1000/UL (ref 4.5–11)
WBC # BLD: 6.3 X1000/UL (ref 4.5–11)

## 2024-08-17 RX ADMIN — PROPOFOL PRN MLS/HR: 10 INJECTION, EMULSION INTRAVENOUS at 02:21

## 2024-08-17 RX ADMIN — Medication PRN LOZ: at 15:46

## 2024-10-29 ENCOUNTER — HOSPITAL ENCOUNTER (EMERGENCY)
Dept: HOSPITAL 87 - ER | Age: 64
End: 2024-10-29
Payer: MEDICARE

## 2024-10-29 VITALS — BODY MASS INDEX: 27.92 KG/M2 | HEIGHT: 65 IN | WEIGHT: 167.55 LBS

## 2024-10-29 DIAGNOSIS — I46.9: ICD-10-CM

## 2024-10-29 DIAGNOSIS — I50.9: ICD-10-CM

## 2024-10-29 DIAGNOSIS — F41.9: ICD-10-CM

## 2024-10-29 DIAGNOSIS — I48.91: ICD-10-CM

## 2024-10-29 DIAGNOSIS — Z88.1: Primary | ICD-10-CM

## 2024-10-29 DIAGNOSIS — Z79.899: ICD-10-CM

## 2024-10-29 DIAGNOSIS — I11.0: ICD-10-CM

## 2024-10-29 PROCEDURE — 31500 INSERT EMERGENCY AIRWAY: CPT

## 2024-10-29 PROCEDURE — 92950 HEART/LUNG RESUSCITATION CPR: CPT

## 2024-10-29 PROCEDURE — 99291 CRITICAL CARE FIRST HOUR: CPT
